# Patient Record
Sex: MALE | Race: BLACK OR AFRICAN AMERICAN | NOT HISPANIC OR LATINO | URBAN - METROPOLITAN AREA
[De-identification: names, ages, dates, MRNs, and addresses within clinical notes are randomized per-mention and may not be internally consistent; named-entity substitution may affect disease eponyms.]

---

## 2023-08-14 ENCOUNTER — INPATIENT (INPATIENT)
Facility: HOSPITAL | Age: 63
LOS: 4 days | Discharge: HOME HEALTH | DRG: 291 | End: 2023-08-19
Attending: STUDENT IN AN ORGANIZED HEALTH CARE EDUCATION/TRAINING PROGRAM | Admitting: STUDENT IN AN ORGANIZED HEALTH CARE EDUCATION/TRAINING PROGRAM
Payer: MEDICARE

## 2023-08-14 VITALS
TEMPERATURE: 100 F | RESPIRATION RATE: 18 BRPM | HEART RATE: 106 BPM | OXYGEN SATURATION: 77 % | SYSTOLIC BLOOD PRESSURE: 151 MMHG | DIASTOLIC BLOOD PRESSURE: 90 MMHG | WEIGHT: 164.91 LBS

## 2023-08-14 DIAGNOSIS — J18.9 PNEUMONIA, UNSPECIFIED ORGANISM: ICD-10-CM

## 2023-08-14 DIAGNOSIS — N40.0 BENIGN PROSTATIC HYPERPLASIA WITHOUT LOWER URINARY TRACT SYMPTOMS: ICD-10-CM

## 2023-08-14 DIAGNOSIS — B20 HUMAN IMMUNODEFICIENCY VIRUS [HIV] DISEASE: ICD-10-CM

## 2023-08-14 DIAGNOSIS — I50.33 ACUTE ON CHRONIC DIASTOLIC (CONGESTIVE) HEART FAILURE: ICD-10-CM

## 2023-08-14 DIAGNOSIS — I26.99 OTHER PULMONARY EMBOLISM WITHOUT ACUTE COR PULMONALE: ICD-10-CM

## 2023-08-14 DIAGNOSIS — Z87.09 PERSONAL HISTORY OF OTHER DISEASES OF THE RESPIRATORY SYSTEM: ICD-10-CM

## 2023-08-14 DIAGNOSIS — E78.5 HYPERLIPIDEMIA, UNSPECIFIED: ICD-10-CM

## 2023-08-14 LAB
ALBUMIN SERPL ELPH-MCNC: 3.2 G/DL — LOW (ref 3.4–5)
ALP SERPL-CCNC: 62 U/L — SIGNIFICANT CHANGE UP (ref 40–120)
ALT FLD-CCNC: 22 U/L — SIGNIFICANT CHANGE UP (ref 12–42)
ANION GAP SERPL CALC-SCNC: 9 MMOL/L — SIGNIFICANT CHANGE UP (ref 9–16)
AST SERPL-CCNC: 32 U/L — SIGNIFICANT CHANGE UP (ref 15–37)
BASOPHILS # BLD AUTO: 0.02 K/UL — SIGNIFICANT CHANGE UP (ref 0–0.2)
BASOPHILS NFR BLD AUTO: 0.2 % — SIGNIFICANT CHANGE UP (ref 0–2)
BILIRUB SERPL-MCNC: 1 MG/DL — SIGNIFICANT CHANGE UP (ref 0.2–1.2)
BUN SERPL-MCNC: 11 MG/DL — SIGNIFICANT CHANGE UP (ref 7–23)
CALCIUM SERPL-MCNC: 8.7 MG/DL — SIGNIFICANT CHANGE UP (ref 8.5–10.5)
CHLORIDE SERPL-SCNC: 104 MMOL/L — SIGNIFICANT CHANGE UP (ref 96–108)
CO2 SERPL-SCNC: 29 MMOL/L — SIGNIFICANT CHANGE UP (ref 22–31)
CREAT SERPL-MCNC: 1.08 MG/DL — SIGNIFICANT CHANGE UP (ref 0.5–1.3)
EGFR: 76 ML/MIN/1.73M2 — SIGNIFICANT CHANGE UP
EOSINOPHIL # BLD AUTO: 0.05 K/UL — SIGNIFICANT CHANGE UP (ref 0–0.5)
EOSINOPHIL NFR BLD AUTO: 0.5 % — SIGNIFICANT CHANGE UP (ref 0–6)
FLUAV AG NPH QL: SIGNIFICANT CHANGE UP
FLUBV AG NPH QL: SIGNIFICANT CHANGE UP
GLUCOSE SERPL-MCNC: 90 MG/DL — SIGNIFICANT CHANGE UP (ref 70–99)
HCT VFR BLD CALC: 39.5 % — SIGNIFICANT CHANGE UP (ref 39–50)
HGB BLD-MCNC: 13 G/DL — SIGNIFICANT CHANGE UP (ref 13–17)
HIV 1 & 2 AB SERPL IA.RAPID: REACTIVE
IMM GRANULOCYTES NFR BLD AUTO: 0.3 % — SIGNIFICANT CHANGE UP (ref 0–0.9)
LYMPHOCYTES # BLD AUTO: 1.66 K/UL — SIGNIFICANT CHANGE UP (ref 1–3.3)
LYMPHOCYTES # BLD AUTO: 15.1 % — SIGNIFICANT CHANGE UP (ref 13–44)
MCHC RBC-ENTMCNC: 32.4 PG — SIGNIFICANT CHANGE UP (ref 27–34)
MCHC RBC-ENTMCNC: 32.9 GM/DL — SIGNIFICANT CHANGE UP (ref 32–36)
MCV RBC AUTO: 98.5 FL — SIGNIFICANT CHANGE UP (ref 80–100)
MONOCYTES # BLD AUTO: 0.77 K/UL — SIGNIFICANT CHANGE UP (ref 0–0.9)
MONOCYTES NFR BLD AUTO: 7 % — SIGNIFICANT CHANGE UP (ref 2–14)
NEUTROPHILS # BLD AUTO: 8.47 K/UL — HIGH (ref 1.8–7.4)
NEUTROPHILS NFR BLD AUTO: 76.9 % — SIGNIFICANT CHANGE UP (ref 43–77)
NRBC # BLD: 0 /100 WBCS — SIGNIFICANT CHANGE UP (ref 0–0)
NT-PROBNP SERPL-SCNC: 4032 PG/ML — HIGH
PCO2 BLDV: 47 MMHG — SIGNIFICANT CHANGE UP (ref 42–55)
PH BLDV: 7.41 — SIGNIFICANT CHANGE UP (ref 7.32–7.43)
PLATELET # BLD AUTO: 254 K/UL — SIGNIFICANT CHANGE UP (ref 150–400)
PO2 BLDV: 67 MMHG — HIGH (ref 25–45)
POTASSIUM SERPL-MCNC: 3.6 MMOL/L — SIGNIFICANT CHANGE UP (ref 3.5–5.3)
POTASSIUM SERPL-SCNC: 3.6 MMOL/L — SIGNIFICANT CHANGE UP (ref 3.5–5.3)
PROT SERPL-MCNC: 7.9 G/DL — SIGNIFICANT CHANGE UP (ref 6.4–8.2)
RBC # BLD: 4.01 M/UL — LOW (ref 4.2–5.8)
RBC # FLD: 14.8 % — HIGH (ref 10.3–14.5)
RSV RNA NPH QL NAA+NON-PROBE: SIGNIFICANT CHANGE UP
SAO2 % BLDV: 94.1 % — HIGH (ref 67–88)
SARS-COV-2 RNA SPEC QL NAA+PROBE: SIGNIFICANT CHANGE UP
SODIUM SERPL-SCNC: 142 MMOL/L — SIGNIFICANT CHANGE UP (ref 132–145)
TROPONIN I, HIGH SENSITIVITY RESULT: 35.9 NG/L — SIGNIFICANT CHANGE UP
WBC # BLD: 11 K/UL — HIGH (ref 3.8–10.5)
WBC # FLD AUTO: 11 K/UL — HIGH (ref 3.8–10.5)

## 2023-08-14 PROCEDURE — 71045 X-RAY EXAM CHEST 1 VIEW: CPT | Mod: 26

## 2023-08-14 PROCEDURE — 99222 1ST HOSP IP/OBS MODERATE 55: CPT

## 2023-08-14 PROCEDURE — 99285 EMERGENCY DEPT VISIT HI MDM: CPT

## 2023-08-14 RX ORDER — CEFTRIAXONE 500 MG/1
1000 INJECTION, POWDER, FOR SOLUTION INTRAMUSCULAR; INTRAVENOUS ONCE
Refills: 0 | Status: COMPLETED | OUTPATIENT
Start: 2023-08-14 | End: 2023-08-14

## 2023-08-14 RX ORDER — BUDESONIDE AND FORMOTEROL FUMARATE DIHYDRATE 160; 4.5 UG/1; UG/1
2 AEROSOL RESPIRATORY (INHALATION)
Refills: 0 | DISCHARGE

## 2023-08-14 RX ORDER — ATORVASTATIN CALCIUM 80 MG/1
1 TABLET, FILM COATED ORAL
Refills: 0 | DISCHARGE

## 2023-08-14 RX ORDER — RIVAROXABAN 15 MG-20MG
1 KIT ORAL
Refills: 0 | DISCHARGE

## 2023-08-14 RX ORDER — FUROSEMIDE 40 MG
40 TABLET ORAL ONCE
Refills: 0 | Status: COMPLETED | OUTPATIENT
Start: 2023-08-14 | End: 2023-08-14

## 2023-08-14 RX ORDER — ALBUTEROL 90 UG/1
2 AEROSOL, METERED ORAL
Refills: 0 | DISCHARGE

## 2023-08-14 RX ORDER — AZITHROMYCIN 500 MG/1
500 TABLET, FILM COATED ORAL ONCE
Refills: 0 | Status: COMPLETED | OUTPATIENT
Start: 2023-08-14 | End: 2023-08-14

## 2023-08-14 RX ADMIN — CEFTRIAXONE 100 MILLIGRAM(S): 500 INJECTION, POWDER, FOR SOLUTION INTRAMUSCULAR; INTRAVENOUS at 15:40

## 2023-08-14 RX ADMIN — Medication 40 MILLIGRAM(S): at 14:36

## 2023-08-14 RX ADMIN — AZITHROMYCIN 255 MILLIGRAM(S): 500 TABLET, FILM COATED ORAL at 16:16

## 2023-08-14 RX ADMIN — AZITHROMYCIN 500 MILLIGRAM(S): 500 TABLET, FILM COATED ORAL at 17:23

## 2023-08-14 NOTE — ED PROVIDER NOTE - PHYSICAL EXAMINATION
hypoxic in no significant distress, speaking in full sentences, RA SpO2  NCAT EOMI PERRL OP clear  heart RRR no murmur   lungs crackles B/L, dec BS B/L, no wheezing, good air entry B/L  abd soft NT ND no CVAT no guarding no rebound   normal neuro exam CN I-XII grossly intact, no groos motor or sensory deficits  B/L LE edema (+) 2 B/L, appears chronic

## 2023-08-14 NOTE — H&P ADULT - PROBLEM SELECTOR PLAN 7
continue HOME MED: Tamsulosin 0.4mg PO daily      N: DASH with 1 liter fluid restriction  E: Maintain K>4.0 and Mg >2.0  VTE PPx: on Xarelto  Code: Full former heroine addict, quit in 1998.  --currently on Methadone 95mg PO daily per patient, will need to verify dose with **[facility] [phone].

## 2023-08-14 NOTE — H&P ADULT - PROBLEM SELECTOR PLAN 2
hx of LLE DVT and PE diagnosed in 8/2020.  -- will continue anticoagulation with HOME MED: Xarelto 20mg PO daily. hx of LLE DVT and PE diagnosed in 8/2020. Recommended to be on lifelong anticoagulation per patient, unclear if he has had hypercoagulable work-up.  -- will continue anticoagulation with HOME MED: Xarelto 20mg PO daily.

## 2023-08-14 NOTE — H&P ADULT - HISTORY OF PRESENT ILLNESS
**INCOMPLETE    63 yr old PMHx of HIV (on HAART since 2008, VL undetectable), COPD (not on home O2), hx of LLE DVT, PE (diagnosed 8/2020, on Xarelto), hyperlipidemia, chronic HFpEF (EF:65%), nonobstructive coronaries (by CCTA 6/30/23), BPH who presented to [facility] 8/14/23 c/o progressively worsening dyspnea on minimal exertion x few days.        Patient states he has had difficulty even climbing out of bed the last few days, upon arrival of [facility] this morning patient was acutely SOB. Patient denies any other symptoms of N/V, diaphoresis, palpitations, chest pain, PND, orthopnea, recent travel or URI's. Patient reports chronic bilateral LE edema. Patient also admit to self discontinuing his Lasix.       In [facility], BP: 151/90, HR: 100s, RR:18, O2 sat: 80s% RA improved to 99% on 2-3LNC, Temp: 99.5F (oral). EKG revealed Sinus tachycardia@101BPM without acute ST/T wave changes. CXR c/w pulmonary vascular congestion, interstitial edema.    Labs notable for: WBC 11 with slight left shift, K 3.6, Trop I 35.9, BNP 4032, COVID/Influenza/RSV negative. Blood cultures obtained.    Patient treated with Ceftriaxone 1g IV x 1 dose, Azithromycin 500mg IV x 1 dose and Lasix 40mg IV x 1 dose.    Patient now transferred to [facility] cardiac telemetry for management of acute on chronic HFpEF exacerbation in setting of suspected CAP and medication noncompliance. **INCOMPLETE    63 yr old PMHx of HIV (on HAART since 2008, VL undetectable), COPD (not on home O2), hx of LLE DVT, PE (diagnosed 8/2020, on Xarelto), hyperlipidemia, chronic HFpEF (EF:65%), nonobstructive coronaries (by CCTA 6/30/23), BPH who presented to [facility] 8/14/23 c/o progressively worsening dyspnea on minimal exertion x few days.        Patient states he has had difficulty even climbing out of bed the last few days, upon arrival of [facility] this morning patient was acutely SOB. Patient denies any other symptoms of N/V, diaphoresis, palpitations, chest pain, PND, orthopnea, recent travel or URI's. Patient reports chronic bilateral LE edema. Patient also admit to self discontinuing his Lasix.       In [facility], BP: 151/90, HR: 100s, RR:18, O2 sat: 80s% RA improved to 99% on 2-3LNC, Temp: 99.5F (oral). EKG revealed Sinus tachycardia@101BPM without acute ST/T wave changes. CXR c/w pulmonary vascular congestion, interstitial edema.    Labs notable for: WBC 11 with slight left shift, K 3.6, Trop I 35.9, BNP 4032, COVID/Influenza/RSV negative. Blood cultures obtained.    Patient treated with Ceftriaxone 1g IV x 1 dose, Azithromycin 500mg IV x 1 dose and Lasix 40mg IV x 1 dose.    Patient now transferred to [facility] cardiac telemetry for management of acute on chronic HFpEF exacerbation in setting of medication noncompliance and suspected CAP. 63 yr old M, chronic smoker, former heroine addict (on Methadone), PMHx of HIV (on HAART since 2008, VL undetectable), COPD (not on home O2), hx of LLE DVT (diagnosed 2018), PE (diagnosed 8/2020, on Xarelto), hyperlipidemia, chronic HFpEF (EF:65%), nonobstructive coronaries (by CCTA 6/30/23), BPH who presented to [facility] 8/14/23 c/o progressively worsening dyspnea on minimal exertion x few days. Patient reports at baseline he is able to ambulate ~1/2 block with his walker prior to resting, however over the last few days he has difficulty even climbing out of bed. Patient further admits to recent fever, chills and productive cough over the past 3 days. Patient admits to orthopnea as he sleeps in recliner and chronic bilateral LE edema. Patient denies any other symptoms of N/V, diaphoresis, palpitations, chest pain or recent travel. Patient reports he self discontinued his Lasix several months ago.     In [facility], BP: 151/90, HR: 100s, RR:18, O2 sat: 80s% RA improved to 99% on 2-3LNC, Temp: 99.5F (oral). EKG revealed Sinus tachycardia@101BPM without acute ST/T wave changes. CXR c/w pulmonary vascular congestion, interstitial edema. Labs notable for: WBC 11 with slight left shift, K 3.6, Trop I 35.9, BNP 4032, COVID/Influenza/RSV negative. Blood cultures obtained. Patient treated with Ceftriaxone 1g IV x 1 dose, Azithromycin 500mg IV x 1 dose and Lasix 40mg IV x 1 dose.    Patient now transferred to [facility] cardiac telemetry for management of acute on chronic HFpEF exacerbation in setting of medication noncompliance and suspected CAP. Chief complaint: dyspnea    63 yr old M, chronic smoker, former heroine addict (on Methadone), PMHx of HIV (on HAART since 2008, VL undetectable), COPD (not on home O2), hx of LLE DVT (diagnosed 2018), PE (diagnosed 8/2020, on Xarelto), hyperlipidemia, chronic HFpEF (EF:65%), nonobstructive coronaries (by CCTA 6/30/23), BPH who presented to [facility] 8/14/23 c/o progressively worsening dyspnea on minimal exertion x few days. Patient reports at baseline he is able to ambulate ~1/2 block with his walker prior to resting, however over the last few days he has difficulty even climbing out of bed. Patient further admits to recent fever, chills and productive cough over the past 3 days. Patient admits to orthopnea as he sleeps in recliner and chronic bilateral LE edema. Patient denies any other symptoms of N/V, diaphoresis, palpitations, chest pain or recent travel. Patient reports he self discontinued his Lasix several months ago.     In [facility], BP: 151/90, HR: 100s, RR:18, O2 sat: 80s% RA improved to 99% on 2-3LNC, Temp: 99.5F (oral). EKG revealed Sinus tachycardia@101BPM without acute ST/T wave changes. CXR c/w pulmonary vascular congestion, interstitial edema. Labs notable for: WBC 11 with slight left shift, K 3.6, Trop I 35.9, BNP 4032, COVID/Influenza/RSV negative. Blood cultures obtained. Patient treated with Ceftriaxone 1g IV x 1 dose, Azithromycin 500mg IV x 1 dose and Lasix 40mg IV x 1 dose.    Patient now transferred to [facility] cardiac telemetry for management of acute on chronic HFpEF exacerbation in setting of medication noncompliance and suspected CAP.

## 2023-08-14 NOTE — H&P ADULT - PROBLEM SELECTOR PLAN 8
continue HOME MED: Tamsulosin 0.8mg PO daily      N: DASH with 1 liter fluid restriction  E: Maintain K>4.0 and Mg >2.0  VTE PPx: on Xarelto  Code: Full

## 2023-08-14 NOTE — H&P ADULT - NSHPOUTPATIENTPROVIDERS_GEN_ALL_CORE
Cannot recall name of Cardiologist/Pulmonologist/PCP, states all affiliated / [facility]  **Emergency contact/- Stvee Fuentes [phone], **[facility] [phone]

## 2023-08-14 NOTE — H&P ADULT - NSICDXPASTMEDICALHX_GEN_ALL_CORE_FT
PAST MEDICAL HISTORY:  COPD exacerbation     HIV infection     HLD (hyperlipidemia)     HTN (hypertension)      PAST MEDICAL HISTORY:  Chronic heart failure with preserved ejection fraction (HFpEF)     COPD exacerbation     DVT, lower extremity     HIV infection     HLD (hyperlipidemia)     HTN (hypertension)     Pulmonary embolism

## 2023-08-14 NOTE — H&P ADULT - PROBLEM SELECTOR PLAN 5
continue HOME MEDS: Ventolin HFA 90mcg/inh PRN, Symbicord 160/4.5mcg 2 puffs inh BID and Spiriva inhaled daily. continue HOME MEDS: Ventolin HFA 90mcg/inh PRN, Symbicort 160/4.5mcg 2 puffs inh BID and Spiriva inhaled daily.

## 2023-08-14 NOTE — H&P ADULT - ASSESSMENT
63 yr old PMHx of HIV (on HAART since 2008, VL undetectable), COPD (not on home O2), hx of LLE DVT, PE (diagnosed 8/2020, on Xarelto), hyperlipidemia, chronic HFpEF (EF:65%), nonobstructive coronaries (by CCTA 6/30/23), BPH who presented to [facility] 8/14/23 c/o progressively worsening dyspnea on minimal exertion x few days,  transferred to [facility] cardiac telemetry for management of acute on chronic HFpEF exacerbation in setting of medication noncompliance and suspected CAP. 63 yr old M chronic smoker, former heroine addict,  PMHx of HIV (on HAART since 2008, VL undetectable), COPD (not on home O2), hx of LLE DVT, PE (diagnosed 8/2020, on Xarelto), hyperlipidemia, chronic HFpEF (EF:65%), nonobstructive coronaries (by CCTA 6/30/23), BPH who presented to [facility] 8/14/23 c/o progressively worsening dyspnea on minimal exertion x few days,  transferred to [facility] cardiac telemetry for management of acute on chronic HFpEF exacerbation in setting of medication noncompliance and suspected CAP.

## 2023-08-14 NOTE — ED PROVIDER NOTE - NSICDXPASTMEDICALHX_GEN_ALL_CORE_FT
PAST MEDICAL HISTORY:  COPD exacerbation     HIV infection     HLD (hyperlipidemia)     HTN (hypertension)

## 2023-08-14 NOTE — H&P ADULT - NSHPREVIEWOFSYSTEMS_GEN_ALL_CORE
Furosemide 20 mg oral tablet: Last Dose Taken:  , 1 orally once a day  Ventolin HFA 90 mcg/inh inhalation aerosol: Last Dose Taken:  , 2 inhaled every 6 hours  Atorvastatin 10 mg oral tablet: Last Dose Taken:  , 1 orally once a day  Tamsulosin 0.4 mg oral capsule: Last Dose Taken:  , 2 orally once a day  Symbicort 160 mcg-4.5 mcg/inh inhalation aerosol: Last Dose Taken:  , 2 inhaled 2 times a day  Spiriva 18 mcg inhalation capsule: Last Dose Taken:  , 1 inhaled  Descovy 200 mg-25 mg oral tablet: Last Dose Taken:  , 1 orally  Xarelto 20 mg oral tablet: Last Dose Taken:  , 1 orally once a day  Tivicay?  Losartan 50mg PO daily?

## 2023-08-14 NOTE — ED ADULT TRIAGE NOTE - AS TEMP SITE
Clinic Care Coordination Contact  Care Team Conversations    RN CC received MyChart message from patient's mom with escalation in concerns regarding his weight. Requested to forward to Dr. Leyva; RN CC forwarded to Dr. Leyva, and upon chart review he has responded with support for mom's nutrition intake plan and offered suggestions regarding thyroid medication regimen.     Mom has not requested additional information (as offered) for respite or additional caregiver resources at this time.     PLAN:  RN CC will continue to offer support, assistance in coordination of recommended follow up. Will place next outreach encounter to coincide with scheduled 3/29/19 office visit with PCP.     Asia Barriga RN   Clinic Care Coordinator-Singh Solomon  PH: [phone]     oral

## 2023-08-14 NOTE — ED ADULT NURSE REASSESSMENT NOTE - NS ED NURSE REASSESS COMMENT FT1
pt endorsed to me, in NAD, resting in stretcher, reports feeling better after water pill was given. pt breathing well on 3L NC. zitrhomax infusion administered as ordered. will continue to monitor.

## 2023-08-14 NOTE — H&P ADULT - PROBLEM SELECTOR PLAN 4
oral temp 99.5F upon arrival to [facility], slight leukocytosis w/ left shift. CXR without definitive consolidation. COVID/Flu/RSV negative. Blood cultures obtained.  --received Azithromycin 500mg IV x 1 dose and Ceftriaxone 1g IV x 1 dose at [facility].  --will F/U Procalcitonin level and consider continuation of antibiotics. oral temp 99.5F upon arrival to [facility], slight leukocytosis w/ left shift. CXR without definitive consolidation. COVID/Flu/RSV negative. Blood cultures obtained.  --received Azithromycin 500mg IV x 1 dose and Ceftriaxone 1g IV x 1 dose at [facility].  --will F/U full RVP panel, Procalcitonin level and consider continuation of antibiotics.

## 2023-08-14 NOTE — ED ADULT NURSE NOTE - HISTORY OF COVID-19 VACCINATION
[facility]      Procedure Note - Central Venous Access:   Performed by Micki Mead NP    Obtained informed Consent. Immediately prior to the procedure, the patient was reevaluated and found suitable for the planned procedure and any planned medications. Immediately prior to the procedure a time out was called to verify the correct patient, procedure, equipment, staff, and marking as appropriate. Shock    Central line Bundle:  Full sterile barrier precautions used. 7-Step Sterility Protocol followed. (cap, mask sterile gown, sterile gloves, large sterile sheet, hand hygiene, 2% chlorhexidine for cutaneous antisepsis)  5 mL 1% Lidocaine placed at insertion site. Patient positioned in Trendelenburg?yes   The site was prepped with ChloraPrep. Using Seldinger technique a Triple Lumen CVC was placed in the Left, Internal Jugular Vein via direct cannulation with 1 number of attempts for IV Access. Ultrasound Guidance was utilized. There was good dark, non-pulsatile blood return in all ports. Femoral Site? no. If Yes, reason femoral site was chosen: na  Catheter secured. Biopatch in place? yes. Sterile Bio-occlusive dressing placed. The following complications were encountered: None. A follow-up chest x-ray was ordered post procedure. The procedure was tolerated well.       Micki Mead NP  Critical Care Medicine  [facility] Vaccine status unknown

## 2023-08-14 NOTE — H&P ADULT - NS ATTEND AMEND GEN_ALL_CORE FT
I saw, evaluated, and examined the patient at the bedside. I discussed the patient’s case with the ACP and agree with ACP’s note, ROS findings, physical exam, assessment, and plan as documented in the ACP’s note, with the following addendum.    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    Assessment:  1) Acute on chronic decompensated HFpEF exacerbation  2) Unprovoked DVT and PE on lifelong AC w/ xarelto  3) HIV on HAART    Plan  1) IV lasix 40mg BID for one more day w/ strict I/O and daily standing weight  2) TTE  3) Losartan 50mg daily  4) C/w AC with xarelto at home dose  5) HAART at home dose    Total of 55 minutes were spent in reviewing the patient’s chart, examining the patient, and discussing patient’s assessment and plan.     Maxx Slade M.D.  CARDIOLOGY ATTENDING

## 2023-08-14 NOTE — H&P ADULT - PROBLEM SELECTOR PLAN 1
+JVD, bibasilar crackles, 2+ pitting edema bilaterally, sating 99% on 2LNC.  --CXR w/ pulmonary vascular congestion, interstitial edema. BNP 4032.  --received Lasix 40mg IV x 1 dose at [facility], home dose Lasix 20mg PO daily which patient has self discontinued.  --will continue Lasix 40mg IV q 12hrs, Losartan 50mg PO daily, strict I/Os and daily weights.  --obtain TTE in AM.      **Prior TTE 3/24/23: LVEF 60-65%, small focal area of hypokinesis in apical inferolateral wall. Dilated RV, septal flattening, PFO by color. MV leaflets are echodense and bileaflet prolapse.    **Recent CCTA@[facility] 6/30/23: calcium score 0, mild annular calcification, mLAD intramyocardial bridge, grossly patent m/dLAD, pLCx, OM1, pRCA.

## 2023-08-14 NOTE — H&P ADULT - PROBLEM SELECTOR PLAN 6
undetectable VL per patient, will continue HOME MEDS: Descovy 200/25mg PO daily and Tivicay   mg PO daily. undetectable VL per patient, will continue HOME MEDS: Descovy 200/25mg PO daily and Tivicay  50 mg PO daily.

## 2023-08-14 NOTE — ED PROVIDER NOTE - PROGRESS NOTE DETAILS
Initially on arrival, patient gave history of COPD and CHF.  HIV positive, I have reported this to patient and he states he is known HIV, viral load undetectable and on Descovy and Tivicay. Improved after Lasix IV, still with oxygen requirement, patient Known to [facility].  I called there to get a medication list at the pharmacy.  I attempted to admit/transfer to [facility], but the transfer center stated that all of his visits are outpatient visits to [facility].  Heart failure patients are no longer being accepted for admission to [facility].  [facility] is in a "bed crunch "and is unable to accept the patient at this time.  Will admit to [facility] for further treatment and work-up of CHF exacerbation.  Patient was also given IV antibiotics for possible underlying pneumonia. Spoke with Dr. Huggins from medicine service, requested admission to cardiology service.  I spoke with cardiology service briefly but conversation was interrupted–transfer center stated they would reconnect with cardiology.  Signed out to Dr. Busby pending cardiology evaluation and final dispo.

## 2023-08-14 NOTE — ED PROVIDER NOTE - OBJECTIVE STATEMENT
63-year-old male who with history of pulmonary embolism on Xarelto, CHF on Lasix 20 mg, COPD on inhalers at home, not oxygen dependent.  Today with complaints of progressive shortness of breath difficulty with ADLs, patient reports that any activity makes him short of breath, he is usually able to get out of bed but for the last several days he has had a decreased ability to do so, decreased exercise or walking tolerance.  He is needing increased help with his ADLs at home over the last few days.  He has a home health aide who is with him who states that he looked to be very short of breath on her arrival this morning.  She called 911 and had him brought to the ED.  On arrival he states he has had no recent upper respiratory infection, no chest pain.  No nausea/vomiting or recent illness, no recent travel.  Reports non compliance with medications - has dced his lasix. 63-year-old male who with history of pulmonary embolism on Xarelto, CHF on Lasix 20 mg, HIV, COPD on inhalers at home, not oxygen dependent.  Today with complaints of progressive shortness of breath difficulty with ADLs, patient reports that any activity makes him short of breath, he is usually able to get out of bed but for the last several days he has had a decreased ability to do so, decreased exercise or walking tolerance.  He is needing increased help with his ADLs at home over the last few days.  He has a home health aide who is with him who states that he looked to be very short of breath on her arrival this morning.  She called 911 and had him brought to the ED.  On arrival he states he has had no recent upper respiratory infection, no chest pain.  No nausea/vomiting or recent illness, no recent travel.  Reports non compliance with medications - has dced his lasix.

## 2023-08-14 NOTE — ED ADULT NURSE NOTE - OBJECTIVE STATEMENT
pt bib ems for SOB at home, hx of COPD but denies use of o2 at home. RA 77% sat, 4L NC sating 95. resp unlabored, lungs CTA. denies chest pain, denies fever/chills, denies N/V/D.

## 2023-08-15 DIAGNOSIS — R63.8 OTHER SYMPTOMS AND SIGNS CONCERNING FOOD AND FLUID INTAKE: ICD-10-CM

## 2023-08-15 DIAGNOSIS — F19.11 OTHER PSYCHOACTIVE SUBSTANCE ABUSE, IN REMISSION: ICD-10-CM

## 2023-08-15 DIAGNOSIS — I26.99 OTHER PULMONARY EMBOLISM WITHOUT ACUTE COR PULMONALE: ICD-10-CM

## 2023-08-15 LAB
A1C WITH ESTIMATED AVERAGE GLUCOSE RESULT: 5.5 % — SIGNIFICANT CHANGE UP (ref 4–5.6)
ANION GAP SERPL CALC-SCNC: 8 MMOL/L — SIGNIFICANT CHANGE UP (ref 5–17)
APTT BLD: 38.3 SEC — HIGH (ref 24.5–35.6)
BUN SERPL-MCNC: 8 MG/DL — SIGNIFICANT CHANGE UP (ref 7–23)
CALCIUM SERPL-MCNC: 9.1 MG/DL — SIGNIFICANT CHANGE UP (ref 8.4–10.5)
CHLORIDE SERPL-SCNC: 98 MMOL/L — SIGNIFICANT CHANGE UP (ref 96–108)
CHOLEST SERPL-MCNC: 130 MG/DL — SIGNIFICANT CHANGE UP
CK MB CFR SERPL CALC: 3.2 NG/ML — SIGNIFICANT CHANGE UP (ref 0–6.7)
CK SERPL-CCNC: 133 U/L — SIGNIFICANT CHANGE UP (ref 30–200)
CO2 SERPL-SCNC: 33 MMOL/L — HIGH (ref 22–31)
CREAT SERPL-MCNC: 0.99 MG/DL — SIGNIFICANT CHANGE UP (ref 0.5–1.3)
EGFR: 85 ML/MIN/1.73M2 — SIGNIFICANT CHANGE UP
ESTIMATED AVERAGE GLUCOSE: 111 MG/DL — SIGNIFICANT CHANGE UP (ref 68–114)
GLUCOSE SERPL-MCNC: 73 MG/DL — SIGNIFICANT CHANGE UP (ref 70–99)
HCT VFR BLD CALC: 40.5 % — SIGNIFICANT CHANGE UP (ref 39–50)
HCV AB S/CO SERPL IA: 169.6 S/CO — HIGH
HCV AB SERPL-IMP: REACTIVE
HDLC SERPL-MCNC: 36 MG/DL — LOW
HGB BLD-MCNC: 13.5 G/DL — SIGNIFICANT CHANGE UP (ref 13–17)
INR BLD: 1.51 — HIGH (ref 0.85–1.18)
LIPID PNL WITH DIRECT LDL SERPL: 83 MG/DL — SIGNIFICANT CHANGE UP
MAGNESIUM SERPL-MCNC: 1.6 MG/DL — SIGNIFICANT CHANGE UP (ref 1.6–2.6)
MCHC RBC-ENTMCNC: 32.4 PG — SIGNIFICANT CHANGE UP (ref 27–34)
MCHC RBC-ENTMCNC: 33.3 GM/DL — SIGNIFICANT CHANGE UP (ref 32–36)
MCV RBC AUTO: 97.1 FL — SIGNIFICANT CHANGE UP (ref 80–100)
NON HDL CHOLESTEROL: 94 MG/DL — SIGNIFICANT CHANGE UP
NRBC # BLD: 0 /100 WBCS — SIGNIFICANT CHANGE UP (ref 0–0)
PHOSPHATE SERPL-MCNC: 3.4 MG/DL — SIGNIFICANT CHANGE UP (ref 2.5–4.5)
PLATELET # BLD AUTO: 239 K/UL — SIGNIFICANT CHANGE UP (ref 150–400)
POTASSIUM SERPL-MCNC: 3.4 MMOL/L — LOW (ref 3.5–5.3)
POTASSIUM SERPL-SCNC: 3.4 MMOL/L — LOW (ref 3.5–5.3)
PROCALCITONIN SERPL-MCNC: 0.08 NG/ML — SIGNIFICANT CHANGE UP (ref 0.02–0.1)
PROTHROM AB SERPL-ACNC: 17 SEC — HIGH (ref 9.5–13)
RAPID RVP RESULT: SIGNIFICANT CHANGE UP
RBC # BLD: 4.17 M/UL — LOW (ref 4.2–5.8)
RBC # FLD: 14.5 % — SIGNIFICANT CHANGE UP (ref 10.3–14.5)
SARS-COV-2 RNA SPEC QL NAA+PROBE: SIGNIFICANT CHANGE UP
SODIUM SERPL-SCNC: 139 MMOL/L — SIGNIFICANT CHANGE UP (ref 135–145)
TRIGL SERPL-MCNC: 57 MG/DL — SIGNIFICANT CHANGE UP
TROPONIN T, HIGH SENSITIVITY RESULT: 23 NG/L — SIGNIFICANT CHANGE UP (ref 0–51)
TROPONIN T, HIGH SENSITIVITY RESULT: 28 NG/L — SIGNIFICANT CHANGE UP (ref 0–51)
WBC # BLD: 6.99 K/UL — SIGNIFICANT CHANGE UP (ref 3.8–10.5)
WBC # FLD AUTO: 6.99 K/UL — SIGNIFICANT CHANGE UP (ref 3.8–10.5)

## 2023-08-15 PROCEDURE — 93306 TTE W/DOPPLER COMPLETE: CPT | Mod: 26

## 2023-08-15 RX ORDER — TIOTROPIUM BROMIDE 18 UG/1
1 CAPSULE ORAL; RESPIRATORY (INHALATION)
Refills: 0 | DISCHARGE

## 2023-08-15 RX ORDER — EMTRICITABINE AND TENOFOVIR DISOPROXIL FUMARATE 200; 300 MG/1; MG/1
1 TABLET, FILM COATED ORAL DAILY
Refills: 0 | Status: DISCONTINUED | OUTPATIENT
Start: 2023-08-15 | End: 2023-08-19

## 2023-08-15 RX ORDER — TAMSULOSIN HYDROCHLORIDE 0.4 MG/1
2 CAPSULE ORAL
Refills: 0 | DISCHARGE

## 2023-08-15 RX ORDER — LOSARTAN POTASSIUM 100 MG/1
1 TABLET, FILM COATED ORAL
Refills: 0 | DISCHARGE

## 2023-08-15 RX ORDER — LOSARTAN POTASSIUM 100 MG/1
50 TABLET, FILM COATED ORAL DAILY
Refills: 0 | Status: DISCONTINUED | OUTPATIENT
Start: 2023-08-15 | End: 2023-08-16

## 2023-08-15 RX ORDER — RIVAROXABAN 15 MG-20MG
20 KIT ORAL
Refills: 0 | Status: DISCONTINUED | OUTPATIENT
Start: 2023-08-15 | End: 2023-08-19

## 2023-08-15 RX ORDER — TAMSULOSIN HYDROCHLORIDE 0.4 MG/1
0.8 CAPSULE ORAL AT BEDTIME
Refills: 0 | Status: DISCONTINUED | OUTPATIENT
Start: 2023-08-15 | End: 2023-08-19

## 2023-08-15 RX ORDER — DOLUTEGRAVIR SODIUM 25 MG/1
50 TABLET, FILM COATED ORAL DAILY
Refills: 0 | Status: DISCONTINUED | OUTPATIENT
Start: 2023-08-15 | End: 2023-08-19

## 2023-08-15 RX ORDER — MAGNESIUM SULFATE 500 MG/ML
2 VIAL (ML) INJECTION ONCE
Refills: 0 | Status: DISCONTINUED | OUTPATIENT
Start: 2023-08-15 | End: 2023-08-15

## 2023-08-15 RX ORDER — ALBUTEROL 90 UG/1
2 AEROSOL, METERED ORAL EVERY 6 HOURS
Refills: 0 | Status: DISCONTINUED | OUTPATIENT
Start: 2023-08-15 | End: 2023-08-19

## 2023-08-15 RX ORDER — DOLUTEGRAVIR SODIUM 25 MG/1
1 TABLET, FILM COATED ORAL
Refills: 0 | DISCHARGE

## 2023-08-15 RX ORDER — LABETALOL HCL 100 MG
10 TABLET ORAL ONCE
Refills: 0 | Status: DISCONTINUED | OUTPATIENT
Start: 2023-08-15 | End: 2023-08-15

## 2023-08-15 RX ORDER — POTASSIUM CHLORIDE 20 MEQ
40 PACKET (EA) ORAL EVERY 4 HOURS
Refills: 0 | Status: COMPLETED | OUTPATIENT
Start: 2023-08-15 | End: 2023-08-15

## 2023-08-15 RX ORDER — MAGNESIUM SULFATE 500 MG/ML
2 VIAL (ML) INJECTION ONCE
Refills: 0 | Status: COMPLETED | OUTPATIENT
Start: 2023-08-15 | End: 2023-08-15

## 2023-08-15 RX ORDER — FUROSEMIDE 40 MG
40 TABLET ORAL EVERY 12 HOURS
Refills: 0 | Status: DISCONTINUED | OUTPATIENT
Start: 2023-08-15 | End: 2023-08-18

## 2023-08-15 RX ORDER — ATORVASTATIN CALCIUM 80 MG/1
10 TABLET, FILM COATED ORAL AT BEDTIME
Refills: 0 | Status: DISCONTINUED | OUTPATIENT
Start: 2023-08-15 | End: 2023-08-19

## 2023-08-15 RX ORDER — METHADONE HYDROCHLORIDE 40 MG/1
85 TABLET ORAL DAILY
Refills: 0 | Status: DISCONTINUED | OUTPATIENT
Start: 2023-08-15 | End: 2023-08-15

## 2023-08-15 RX ORDER — EMTRICITABINE AND TENOFOVIR DISOPROXIL FUMARATE 200; 300 MG/1; MG/1
1 TABLET, FILM COATED ORAL
Refills: 0 | DISCHARGE

## 2023-08-15 RX ORDER — METHADONE HYDROCHLORIDE 40 MG/1
85 TABLET ORAL DAILY
Refills: 0 | Status: DISCONTINUED | OUTPATIENT
Start: 2023-08-15 | End: 2023-08-16

## 2023-08-15 RX ORDER — TIOTROPIUM BROMIDE 18 UG/1
2 CAPSULE ORAL; RESPIRATORY (INHALATION) DAILY
Refills: 0 | Status: DISCONTINUED | OUTPATIENT
Start: 2023-08-15 | End: 2023-08-19

## 2023-08-15 RX ORDER — BUDESONIDE AND FORMOTEROL FUMARATE DIHYDRATE 160; 4.5 UG/1; UG/1
2 AEROSOL RESPIRATORY (INHALATION)
Refills: 0 | Status: DISCONTINUED | OUTPATIENT
Start: 2023-08-15 | End: 2023-08-19

## 2023-08-15 RX ADMIN — TAMSULOSIN HYDROCHLORIDE 0.8 MILLIGRAM(S): 0.4 CAPSULE ORAL at 22:18

## 2023-08-15 RX ADMIN — ATORVASTATIN CALCIUM 10 MILLIGRAM(S): 80 TABLET, FILM COATED ORAL at 22:18

## 2023-08-15 RX ADMIN — EMTRICITABINE AND TENOFOVIR DISOPROXIL FUMARATE 1 TABLET(S): 200; 300 TABLET, FILM COATED ORAL at 13:48

## 2023-08-15 RX ADMIN — Medication 40 MILLIGRAM(S): at 04:49

## 2023-08-15 RX ADMIN — BUDESONIDE AND FORMOTEROL FUMARATE DIHYDRATE 2 PUFF(S): 160; 4.5 AEROSOL RESPIRATORY (INHALATION) at 06:46

## 2023-08-15 RX ADMIN — RIVAROXABAN 20 MILLIGRAM(S): KIT at 18:31

## 2023-08-15 RX ADMIN — Medication 40 MILLIGRAM(S): at 18:31

## 2023-08-15 RX ADMIN — METHADONE HYDROCHLORIDE 85 MILLIGRAM(S): 40 TABLET ORAL at 17:15

## 2023-08-15 RX ADMIN — Medication 40 MILLIEQUIVALENT(S): at 13:48

## 2023-08-15 RX ADMIN — Medication 40 MILLIEQUIVALENT(S): at 06:46

## 2023-08-15 RX ADMIN — Medication 25 GRAM(S): at 06:47

## 2023-08-15 RX ADMIN — BUDESONIDE AND FORMOTEROL FUMARATE DIHYDRATE 2 PUFF(S): 160; 4.5 AEROSOL RESPIRATORY (INHALATION) at 18:31

## 2023-08-15 RX ADMIN — DOLUTEGRAVIR SODIUM 50 MILLIGRAM(S): 25 TABLET, FILM COATED ORAL at 13:48

## 2023-08-15 RX ADMIN — LOSARTAN POTASSIUM 50 MILLIGRAM(S): 100 TABLET, FILM COATED ORAL at 04:49

## 2023-08-15 RX ADMIN — TIOTROPIUM BROMIDE 2 PUFF(S): 18 CAPSULE ORAL; RESPIRATORY (INHALATION) at 16:33

## 2023-08-15 NOTE — DIETITIAN NUTRITION RISK NOTIFICATION - TREATMENT: THE FOLLOWING DIET HAS BEEN RECOMMENDED
Diet, DASH/TLC:   Sodium & Cholesterol Restricted  1000mL Fluid Restriction ([id number]) (08-15-23 @ 03:46) [Active]

## 2023-08-15 NOTE — PATIENT PROFILE ADULT - FALL HARM RISK - HARM RISK INTERVENTIONS
Assistance with ambulation/Assistance OOB with selected safe patient handling equipment/Communicate Risk of Fall with Harm to all staff/Discuss with provider need for PT consult/Monitor gait and stability/Provide patient with walking aids - walker, cane, crutches/Reinforce activity limits and safety measures with patient and family/Review medications for side effects contributing to fall risk/Sit up slowly, dangle for a short time, stand at bedside before walking/Tailored Fall Risk Interventions/Toileting schedule using arm’s reach rule for commode and bathroom/Visual Cue: Yellow wristband and red socks/Bed in lowest position, wheels locked, appropriate side rails in place/Call bell, personal items and telephone in reach/Instruct patient to call for assistance before getting out of bed or chair/Non-slip footwear when patient is out of bed/[place] to call system/Physically safe environment - no spills, clutter or unnecessary equipment/Purposeful Proactive Rounding/Room/bathroom lighting operational, light cord in reach

## 2023-08-15 NOTE — DIETITIAN INITIAL EVALUATION ADULT - OTHER CALCULATIONS
Based on Standards of Care pt within % IBW (108%) however pt is edematous and ideal body weight used for all calculations. Needs adjusted for advanced age and malnutrition. Fluid recs per team.  Based on Standards of Care pt within % IBW (108%) thus actual body weight used for all calculations. Needs adjusted for advanced age and malnutrition. Fluid recs per team.

## 2023-08-15 NOTE — PROGRESS NOTE ADULT - PROBLEM SELECTOR PLAN 8
continue HOME MED: Tamsulosin 0.8mg PO daily      N: DASH with 1 liter fluid restriction  E: Maintain K>4.0 and Mg >2.0  VTE PPx: on Xarelto  Code: Full continue HOME MED: Tamsulosin 0.8mg PO daily

## 2023-08-15 NOTE — PHYSICAL THERAPY INITIAL EVALUATION ADULT - ADDITIONAL COMMENTS
Patient lives alone in an apartment with elevator and a ramp to enter the building. Uses a cane to ambulate. Bathroom set up with walk-in shower and has a shower chair and hand rails. HHA 2x/week for 4 hours. Independent with all ADLs.

## 2023-08-15 NOTE — PROGRESS NOTE ADULT - PROBLEM SELECTOR PLAN 6
undetectable VL per patient, will continue HOME MEDS: Descovy 200/25mg PO daily and Tivicay  50 mg PO daily.

## 2023-08-15 NOTE — DIETITIAN INITIAL EVALUATION ADULT - ENERGY INTAKE
Fair (50-75%) Pt is tolerating current diet: DASH/TLC, 1000ml Fluid restriction. States that this morning he consumed 50-75% of his breakfast.

## 2023-08-15 NOTE — PROGRESS NOTE ADULT - PROBLEM SELECTOR PLAN 2
hx of LLE DVT and PE diagnosed in 8/2020. Recommended to be on lifelong anticoagulation per patient, unclear if he has had hypercoagulable work-up.  -- will continue anticoagulation with HOME MED: Xarelto 20mg PO daily.

## 2023-08-15 NOTE — DIETITIAN NUTRITION RISK NOTIFICATION - ADDITIONAL COMMENTS/DIETITIAN RECOMMENDATIONS
Acute Severe malnutrition related to inability to meet physiological demands as evidenced by severe muscle and moderate fat depletions    1. Continue current diet as tolerated: DASH/TLC, 1000ml Fluid restriction. Fluids deferred to medical team. Diet texture/fluid consistencies based on SLP recommendations.   2. Recommend Ensure High Protein x2/day (350 kcal, 20 g protein in each) to optimize intake   3. Encourage PO intake and honor food preferences as able unless otherwise contraindicated.   4. Monitor PO intake, diet tolerance, weight trends, labs, and skin integrity and bowel movement regularity.   5. RD remains available upon request and will follow-up per protocol.

## 2023-08-15 NOTE — DIETITIAN INITIAL EVALUATION ADULT - NS FNS DIET ORDER
Diet, DASH/TLC:   Sodium & Cholesterol Restricted  1000mL Fluid Restriction ([id number]) (08-15-23 @ 03:46)

## 2023-08-15 NOTE — DIETITIAN INITIAL EVALUATION ADULT - OTHER INFO
- Nutritionally Pertinent Labs 8/15: K: 3.4 (L), HDL: 36 (L), A1C: 5.5%   - Pt is currently being diuresed with furosemide

## 2023-08-15 NOTE — DIETITIAN INITIAL EVALUATION ADULT - PERTINENT LABORATORY DATA
08-15    139  |  98  |  8   ----------------------------<  73  3.4<L>   |  33<H>  |  0.99    Ca    9.1      15 Aug 2023 03:45  Phos  3.4     08-15  Mg     1.6     08-15    TPro  7.9  /  Alb  3.2<L>  /  TBili  1.0  /  DBili  x   /  AST  32  /  ALT  22  /  AlkPhos  62  08-14  A1C with Estimated Average Glucose Result: 5.5 % (08-15-23 @ 03:45)

## 2023-08-15 NOTE — PROGRESS NOTE ADULT - REASON FOR ADMISSION
acute on chronic HFpEF exacerbation in setting of medication noncompliance and suspected CAP. acute on chronic HFpEF exacerbation in setting of medication noncompliance.

## 2023-08-15 NOTE — PROGRESS NOTE ADULT - SUBJECTIVE AND OBJECTIVE BOX
**INCOMPLETE NOTE    OVERNIGHT EVENTS:    SUBJECTIVE:  Patient seen and examined at bedside.    Vital Signs Last 12 Hrs  T(F): 97.9 (08-15-23 @ 04:36), Max: 98.2 (08-14-23 @ 22:16)  HR: 63 (08-15-23 @ 06:39) (63 - 73)  BP: 121/77 (08-15-23 @ 06:39) (121/77 - 178/91)  BP(mean): 95 (08-15-23 @ 06:39) (95 - 120)  RR: 16 (08-15-23 @ 06:39) (16 - 18)  SpO2: 99% (08-15-23 @ 06:39) (96% - 100%)  I&O's Summary    14 Aug 2023 07:01  -  15 Aug 2023 07:00  --------------------------------------------------------  IN: 0 mL / OUT: 1300 mL / NET: -1300 mL        PHYSICAL EXAM:  Constitutional: NAD, comfortable in bed.  HEENT: NC/AT, PERRLA, EOMI, no conjunctival pallor or scleral icterus, MMM  Neck: Supple, no JVD  Respiratory: CTA B/L. No w/r/r.   Cardiovascular: RRR, normal S1 and S2, no m/r/g.   Gastrointestinal: +BS, soft NTND, no guarding or rebound tenderness, no palpable masses   Extremities: wwp; no cyanosis, clubbing or edema.   Vascular: Pulses equal and strong throughout.   Neurological: AAOx3, no CN deficits, strength and sensation intact throughout.   Skin: No gross skin abnormalities or rashes        LABS:                        13.5   6.99  )-----------( 239      ( 15 Aug 2023 03:45 )             40.5     08-15    139  |  98  |  8   ----------------------------<  73  3.4<L>   |  33<H>  |  0.99    Ca    9.1      15 Aug 2023 03:45  Mg     1.6     08-15    TPro  7.9  /  Alb  3.2<L>  /  TBili  1.0  /  DBili  x   /  AST  32  /  ALT  22  /  AlkPhos  62  08-14    PT/INR - ( 15 Aug 2023 03:45 )   PT: 17.0 sec;   INR: 1.51          PTT - ( 15 Aug 2023 03:45 )  PTT:38.3 sec  Urinalysis Basic - ( 15 Aug 2023 03:45 )    Color: x / Appearance: x / SG: x / pH: x  Gluc: 73 mg/dL / Ketone: x  / Bili: x / Urobili: x   Blood: x / Protein: x / Nitrite: x   Leuk Esterase: x / RBC: x / WBC x   Sq Epi: x / Non Sq Epi: x / Bacteria: x          RADIOLOGY & ADDITIONAL TESTS:    MEDICATIONS  (STANDING):  atorvastatin 10 milliGRAM(s) Oral at bedtime  budesonide 160 MICROgram(s)/formoterol 4.5 MICROgram(s) Inhaler 2 Puff(s) Inhalation two times a day  dolutegravir 50 milliGRAM(s) Oral daily  emtricitabine 200 mG/tenofovir alafenamide 25 mG (DESCOVY) Tablet 1 Tablet(s) Oral daily  furosemide   Injectable 40 milliGRAM(s) IV Push every 12 hours  losartan 50 milliGRAM(s) Oral daily  magnesium sulfate  IVPB 2 Gram(s) IV Intermittent once  potassium chloride    Tablet ER 40 milliEquivalent(s) Oral every 4 hours  rivaroxaban 20 milliGRAM(s) Oral with dinner  tamsulosin 0.8 milliGRAM(s) Oral at bedtime  tiotropium 2.5 MICROgram(s) Inhaler 2 Puff(s) Inhalation daily    MEDICATIONS  (PRN):  albuterol    90 MICROgram(s) HFA Inhaler 2 Puff(s) Inhalation every 6 hours PRN Shortness of Breath and/or Wheezing   OVERNIGHT EVENTS: none to report    SUBJECTIVE:  Patient seen and examined at bedside. Found stable and in NAD while on NC 2L. Denies SOB, chest pain, fever, chills, or cough.    Vital Signs Last 12 Hrs  T(F): 97.9 (08-15-23 @ 04:36), Max: 98.2 (08-14-23 @ 22:16)  HR: 63 (08-15-23 @ 06:39) (63 - 73)  BP: 121/77 (08-15-23 @ 06:39) (121/77 - 178/91)  BP(mean): 95 (08-15-23 @ 06:39) (95 - 120)  RR: 16 (08-15-23 @ 06:39) (16 - 18)  SpO2: 99% (08-15-23 @ 06:39) (96% - 100%)  I&O's Summary    14 Aug 2023 07:01  -  15 Aug 2023 07:00  --------------------------------------------------------  IN: 0 mL / OUT: 1300 mL / NET: -1300 mL        PHYSICAL EXAM:  Constitutional: well-groomed; no distress  Eyes: PERRL; EOMI; conjunctiva clear  Respiratory: diffuse fine crackles noted from mid lobes to bilateral bases  Cardiovascular	regular rate and rhythm: S1 S2 present; no murmur; JVD; peripheral edema; bilateral non-pitting edema.  Gastrointestinal: soft; nontender; nondistended; normal active bowel sounds  Neurological: cranial nerves II-XII intact; sensation intact  Skin: warm and dry; color normal; no rashes; no ulcers  Psychiatric: normal affect; alert and oriented x3; normal behavior        LABS:                        13.5   6.99  )-----------( 239      ( 15 Aug 2023 03:45 )             40.5     08-15    139  |  98  |  8   ----------------------------<  73  3.4<L>   |  33<H>  |  0.99    Ca    9.1      15 Aug 2023 03:45  Mg     1.6     08-15    TPro  7.9  /  Alb  3.2<L>  /  TBili  1.0  /  DBili  x   /  AST  32  /  ALT  22  /  AlkPhos  62  08-14    PT/INR - ( 15 Aug 2023 03:45 )   PT: 17.0 sec;   INR: 1.51          PTT - ( 15 Aug 2023 03:45 )  PTT:38.3 sec  Urinalysis Basic - ( 15 Aug 2023 03:45 )    Color: x / Appearance: x / SG: x / pH: x  Gluc: 73 mg/dL / Ketone: x  / Bili: x / Urobili: x   Blood: x / Protein: x / Nitrite: x   Leuk Esterase: x / RBC: x / WBC x   Sq Epi: x / Non Sq Epi: x / Bacteria: x          RADIOLOGY & ADDITIONAL TESTS:    MEDICATIONS  (STANDING):  atorvastatin 10 milliGRAM(s) Oral at bedtime  budesonide 160 MICROgram(s)/formoterol 4.5 MICROgram(s) Inhaler 2 Puff(s) Inhalation two times a day  dolutegravir 50 milliGRAM(s) Oral daily  emtricitabine 200 mG/tenofovir alafenamide 25 mG (DESCOVY) Tablet 1 Tablet(s) Oral daily  furosemide   Injectable 40 milliGRAM(s) IV Push every 12 hours  losartan 50 milliGRAM(s) Oral daily  magnesium sulfate  IVPB 2 Gram(s) IV Intermittent once  potassium chloride    Tablet ER 40 milliEquivalent(s) Oral every 4 hours  rivaroxaban 20 milliGRAM(s) Oral with dinner  tamsulosin 0.8 milliGRAM(s) Oral at bedtime  tiotropium 2.5 MICROgram(s) Inhaler 2 Puff(s) Inhalation daily    MEDICATIONS  (PRN):  albuterol    90 MICROgram(s) HFA Inhaler 2 Puff(s) Inhalation every 6 hours PRN Shortness of Breath and/or Wheezing

## 2023-08-15 NOTE — DIETITIAN INITIAL EVALUATION ADULT - PERTINENT MEDS FT
MEDICATIONS  (STANDING):  atorvastatin 10 milliGRAM(s) Oral at bedtime  budesonide 160 MICROgram(s)/formoterol 4.5 MICROgram(s) Inhaler 2 Puff(s) Inhalation two times a day  dolutegravir 50 milliGRAM(s) Oral daily  emtricitabine 200 mG/tenofovir alafenamide 25 mG (DESCOVY) Tablet 1 Tablet(s) Oral daily  furosemide   Injectable 40 milliGRAM(s) IV Push every 12 hours  losartan 50 milliGRAM(s) Oral daily  methadone   Solution 85 milliGRAM(s) Oral daily  potassium chloride    Tablet ER 40 milliEquivalent(s) Oral every 4 hours  rivaroxaban 20 milliGRAM(s) Oral with dinner  tamsulosin 0.8 milliGRAM(s) Oral at bedtime  tiotropium 2.5 MICROgram(s) Inhaler 2 Puff(s) Inhalation daily    MEDICATIONS  (PRN):  albuterol    90 MICROgram(s) HFA Inhaler 2 Puff(s) Inhalation every 6 hours PRN Shortness of Breath and/or Wheezing

## 2023-08-15 NOTE — PROGRESS NOTE ADULT - PROBLEM SELECTOR PLAN 1
+JVD, bibasilar crackles, 2+ pitting edema bilaterally, sating 99% on 2LNC.  --CXR w/ pulmonary vascular congestion, interstitial edema. BNP 4032.  --received Lasix 40mg IV x 1 dose at [facility], home dose Lasix 20mg PO daily which patient has self discontinued.  --will continue Lasix 40mg IV q 12hrs, Losartan 50mg PO daily, strict I/Os and daily weights.  --obtain TTE in AM.      **Prior TTE 3/24/23: LVEF 60-65%, small focal area of hypokinesis in apical inferolateral wall. Dilated RV, septal flattening, PFO by color. MV leaflets are echodense and bileaflet prolapse.    **Recent [email or web address] 6/30/23: calcium score 0, mild annular calcification, mLAD intramyocardial bridge, grossly patent m/dLAD, pLCx, OM1, pRCA. Patient currently stable and asymptomatic on NC 2L satting >95%. Echo showed EF 60-65% unchanged from previous on 3/24/23. Will benefit from continued diuresis with Lasix 40mg BID IVP. Daily weights and strict I/Os monitoring. Fluid restriction to 1L daily.           - C/w Lasix 40mg BID IVP  - C/w Losartan 50mg PO QD   - Strict I/Os and daily weights.      **Prior TTE 3/24/23: LVEF 60-65%, small focal area of hypokinesis in apical inferolateral wall. Dilated RV, septal flattening, PFO by color. MV leaflets are echodense and bileaflet prolapse.    **Recent [email or web address] 6/30/23: calcium score 0, mild annular calcification, mLAD intramyocardial bridge, grossly patent m/dLAD, pLCx, OM1, pRCA.

## 2023-08-15 NOTE — DIETITIAN INITIAL EVALUATION ADULT - ADD RECOMMEND
1. Continue current diet as tolerated: DASH/TLC, 1000ml Fluid restriction. Fluids deferred to medical team. Diet texture/fluid consistencies based on SLP recommendations.   2. Recommend Ensure High Protein x2/day (350 kcal, 20 g protein in each) to optimize intake   3. Encourage PO intake and honor food preferences as able unless otherwise contraindicated.   4. Monitor PO intake, diet tolerance, weight trends, labs, and skin integrity and bowel movement regularity.   5. RD remains available upon request and will follow-up per protocol.

## 2023-08-15 NOTE — DIETITIAN INITIAL EVALUATION ADULT - PROBLEM SELECTOR PLAN 4
oral temp 99.5F upon arrival to [facility], slight leukocytosis w/ left shift. CXR without definitive consolidation. COVID/Flu/RSV negative. Blood cultures obtained.  --received Azithromycin 500mg IV x 1 dose and Ceftriaxone 1g IV x 1 dose at [facility].  --will F/U full RVP panel, Procalcitonin level and consider continuation of antibiotics.

## 2023-08-15 NOTE — ED ADULT NURSE REASSESSMENT NOTE - NS ED NURSE REASSESS COMMENT FT1
Received pt from previous RN, sleeping comfortably in stretcher and attached to cm and 3Lnc supp o2 therapy. Saturating at 100%. No distress noted. Pt is admitted and is currently awaiting available ambulance for transport to designated hospital.

## 2023-08-15 NOTE — PROGRESS NOTE ADULT - PROBLEM SELECTOR PLAN 7
former heroine addict, quit in 1998.  --currently on Methadone 95mg PO daily per patient, will need to verify dose with **[facility] [phone]. former heroine addict, quit in 1998. Currently on methadone 85mg QD PO.  - C/w methadone 85mg QD PO

## 2023-08-15 NOTE — DIETITIAN INITIAL EVALUATION ADULT - EDUCATION DIETARY MODIFICATIONS
Educated/Discussed the importance to prioritize protein at meal times. Reviewed good sources of protein on the menu. Discussed DASH diet education. Reviewed foods high in sodium and cholesterol to avoid. Reviewed ways to decrease sodium in your diet. Pt receptive and agreeable to education./(2) meets goals/outcomes/verbalization

## 2023-08-15 NOTE — DIETITIAN INITIAL EVALUATION ADULT - NSICDXPASTMEDICALHX_GEN_ALL_CORE_FT
PAST MEDICAL HISTORY:  Chronic heart failure with preserved ejection fraction (HFpEF)     COPD exacerbation     DVT, lower extremity     HIV infection     HLD (hyperlipidemia)     HTN (hypertension)     Pulmonary embolism

## 2023-08-15 NOTE — PROGRESS NOTE ADULT - PROBLEM SELECTOR PLAN 5
continue HOME MEDS: Ventolin HFA 90mcg/inh PRN, Symbicort 160/4.5mcg 2 puffs inh BID and Spiriva inhaled daily.

## 2023-08-15 NOTE — DIETITIAN INITIAL EVALUATION ADULT - NSFNSGIASSESSMENTFT_GEN_A_CORE
No issues with nausea, vomiting, diarrhea, constipation reported at time of visit. Last BM 8/13 per flow sheets. Abdominal appearance rounded, soft/nontender per flow sheets. Pt is currently not currently on any bowel regimen.

## 2023-08-15 NOTE — PROGRESS NOTE ADULT - PROBLEM SELECTOR PLAN 9
N: DASH with 1 liter fluid restriction. Per nutrition 2 ensures enlive QD  E: Maintain K>4.0 and Mg >2.0  VTE PPx: on Xarelto  Code: Full

## 2023-08-15 NOTE — PROGRESS NOTE ADULT - PROBLEM SELECTOR PLAN 4
oral temp 99.5F upon arrival to [facility], slight leukocytosis w/ left shift. CXR without definitive consolidation. COVID/Flu/RSV negative. Blood cultures obtained.  --received Azithromycin 500mg IV x 1 dose and Ceftriaxone 1g IV x 1 dose at [facility].  --will F/U full RVP panel, Procalcitonin level and consider continuation of antibiotics. (RESOLVED)  Patient currently afebrile and with CBC wnl. D/C'd antibiotics as no longer needed.

## 2023-08-15 NOTE — PROGRESS NOTE ADULT - PROBLEM SELECTOR PLAN 3
continue HOME MED: Atorvastatin 10mg PO qhs.  --F/U lipid panel Lipid panel wnl. Will continue home lipitor.    -C/w Atorvastatin 10mg PO qhs.

## 2023-08-15 NOTE — DIETITIAN INITIAL EVALUATION ADULT - PROBLEM SELECTOR PLAN 1
+JVD, bibasilar crackles, 2+ pitting edema bilaterally, sating 99% on 2LNC.  --CXR w/ pulmonary vascular congestion, interstitial edema. BNP 4032.  --received Lasix 40mg IV x 1 dose at [facility], home dose Lasix 20mg PO daily which patient has self discontinued.  --will continue Lasix 40mg IV q 12hrs, Losartan 50mg PO daily, strict I/Os and daily weights.  --obtain TTE in AM.      **Prior TTE 3/24/23: LVEF 60-65%, small focal area of hypokinesis in apical inferolateral wall. Dilated RV, septal flattening, PFO by color. MV leaflets are echodense and bileaflet prolapse.    **Recent [email or web address] 6/30/23: calcium score 0, mild annular calcification, mLAD intramyocardial bridge, grossly patent m/dLAD, pLCx, OM1, pRCA.

## 2023-08-15 NOTE — DIETITIAN INITIAL EVALUATION ADULT - REASON FOR ADMISSION
"63 yr old M chronic smoker, former heroine addict,  PMHx of HIV (on HAART since 2008, VL undetectable), COPD (not on home O2), hx of LLE DVT, PE (diagnosed 8/2020, on Xarelto), hyperlipidemia, chronic HFpEF (EF:65%), nonobstructive coronaries (by CCTA 6/30/23), BPH who presented to [facility] 8/14/23 c/o progressively worsening dyspnea on minimal exertion x few days,  transferred to [facility] cardiac telemetry for management of acute on chronic HFpEF exacerbation in setting of medication noncompliance and suspected CAP. "

## 2023-08-15 NOTE — DIETITIAN INITIAL EVALUATION ADULT - ORAL INTAKE PTA/DIET HISTORY
Visited pt at bedside. States that at home he has a good appetite. States that he follows a low salt diet at home. No cultural, ethnic, Spiritism food preferences noted. Confirms No known food allergies. States that he usually weighs ~180 pounds but feels as he has lost some weight within the past month, but unable to quantify the amount. Pt currently weighs 180 pounds (dosing).

## 2023-08-15 NOTE — DIETITIAN INITIAL EVALUATION ADULT - PROBLEM SELECTOR PLAN 7
former heroine addict, quit in 1998.  --currently on Methadone 95mg PO daily per patient, will need to verify dose with **[facility] [phone].

## 2023-08-16 LAB
ALBUMIN SERPL ELPH-MCNC: 3.6 G/DL — SIGNIFICANT CHANGE UP (ref 3.3–5)
ALP SERPL-CCNC: 66 U/L — SIGNIFICANT CHANGE UP (ref 40–120)
ALT FLD-CCNC: 13 U/L — SIGNIFICANT CHANGE UP (ref 10–45)
ANION GAP SERPL CALC-SCNC: 9 MMOL/L — SIGNIFICANT CHANGE UP (ref 5–17)
AST SERPL-CCNC: 24 U/L — SIGNIFICANT CHANGE UP (ref 10–40)
BASOPHILS # BLD AUTO: 0.02 K/UL — SIGNIFICANT CHANGE UP (ref 0–0.2)
BASOPHILS NFR BLD AUTO: 0.3 % — SIGNIFICANT CHANGE UP (ref 0–2)
BILIRUB SERPL-MCNC: 0.5 MG/DL — SIGNIFICANT CHANGE UP (ref 0.2–1.2)
BUN SERPL-MCNC: 11 MG/DL — SIGNIFICANT CHANGE UP (ref 7–23)
CALCIUM SERPL-MCNC: 9.6 MG/DL — SIGNIFICANT CHANGE UP (ref 8.4–10.5)
CHLORIDE SERPL-SCNC: 97 MMOL/L — SIGNIFICANT CHANGE UP (ref 96–108)
CO2 SERPL-SCNC: 35 MMOL/L — HIGH (ref 22–31)
CREAT SERPL-MCNC: 0.94 MG/DL — SIGNIFICANT CHANGE UP (ref 0.5–1.3)
EGFR: 90 ML/MIN/1.73M2 — SIGNIFICANT CHANGE UP
EOSINOPHIL # BLD AUTO: 0.22 K/UL — SIGNIFICANT CHANGE UP (ref 0–0.5)
EOSINOPHIL NFR BLD AUTO: 3.3 % — SIGNIFICANT CHANGE UP (ref 0–6)
GLUCOSE SERPL-MCNC: 120 MG/DL — HIGH (ref 70–99)
HCT VFR BLD CALC: 45.9 % — SIGNIFICANT CHANGE UP (ref 39–50)
HCV RNA FLD QL NAA+PROBE: SIGNIFICANT CHANGE UP
HCV RNA SPEC QL PROBE+SIG AMP: SIGNIFICANT CHANGE UP
HGB BLD-MCNC: 14.6 G/DL — SIGNIFICANT CHANGE UP (ref 13–17)
IMM GRANULOCYTES NFR BLD AUTO: 0.2 % — SIGNIFICANT CHANGE UP (ref 0–0.9)
LYMPHOCYTES # BLD AUTO: 2.26 K/UL — SIGNIFICANT CHANGE UP (ref 1–3.3)
LYMPHOCYTES # BLD AUTO: 34.4 % — SIGNIFICANT CHANGE UP (ref 13–44)
MAGNESIUM SERPL-MCNC: 1.8 MG/DL — SIGNIFICANT CHANGE UP (ref 1.6–2.6)
MCHC RBC-ENTMCNC: 31.7 PG — SIGNIFICANT CHANGE UP (ref 27–34)
MCHC RBC-ENTMCNC: 31.8 GM/DL — LOW (ref 32–36)
MCV RBC AUTO: 99.6 FL — SIGNIFICANT CHANGE UP (ref 80–100)
MONOCYTES # BLD AUTO: 0.85 K/UL — SIGNIFICANT CHANGE UP (ref 0–0.9)
MONOCYTES NFR BLD AUTO: 12.9 % — SIGNIFICANT CHANGE UP (ref 2–14)
NEUTROPHILS # BLD AUTO: 3.21 K/UL — SIGNIFICANT CHANGE UP (ref 1.8–7.4)
NEUTROPHILS NFR BLD AUTO: 48.9 % — SIGNIFICANT CHANGE UP (ref 43–77)
NRBC # BLD: 0 /100 WBCS — SIGNIFICANT CHANGE UP (ref 0–0)
PHOSPHATE SERPL-MCNC: 3.6 MG/DL — SIGNIFICANT CHANGE UP (ref 2.5–4.5)
PLATELET # BLD AUTO: 284 K/UL — SIGNIFICANT CHANGE UP (ref 150–400)
POTASSIUM SERPL-MCNC: 4 MMOL/L — SIGNIFICANT CHANGE UP (ref 3.5–5.3)
POTASSIUM SERPL-SCNC: 4 MMOL/L — SIGNIFICANT CHANGE UP (ref 3.5–5.3)
PROT SERPL-MCNC: 8 G/DL — SIGNIFICANT CHANGE UP (ref 6–8.3)
RBC # BLD: 4.61 M/UL — SIGNIFICANT CHANGE UP (ref 4.2–5.8)
RBC # FLD: 14.9 % — HIGH (ref 10.3–14.5)
SODIUM SERPL-SCNC: 141 MMOL/L — SIGNIFICANT CHANGE UP (ref 135–145)
WBC # BLD: 6.57 K/UL — SIGNIFICANT CHANGE UP (ref 3.8–10.5)
WBC # FLD AUTO: 6.57 K/UL — SIGNIFICANT CHANGE UP (ref 3.8–10.5)

## 2023-08-16 PROCEDURE — 99232 SBSQ HOSP IP/OBS MODERATE 35: CPT

## 2023-08-16 RX ORDER — SPIRONOLACTONE 25 MG/1
25 TABLET, FILM COATED ORAL DAILY
Refills: 0 | Status: DISCONTINUED | OUTPATIENT
Start: 2023-08-16 | End: 2023-08-18

## 2023-08-16 RX ORDER — POLYETHYLENE GLYCOL 3350 17 G/17G
17 POWDER, FOR SOLUTION ORAL
Refills: 0 | Status: DISCONTINUED | OUTPATIENT
Start: 2023-08-16 | End: 2023-08-19

## 2023-08-16 RX ORDER — MAGNESIUM OXIDE 400 MG ORAL TABLET 241.3 MG
400 TABLET ORAL ONCE
Refills: 0 | Status: COMPLETED | OUTPATIENT
Start: 2023-08-16 | End: 2023-08-16

## 2023-08-16 RX ORDER — METHADONE HYDROCHLORIDE 40 MG/1
85 TABLET ORAL DAILY
Refills: 0 | Status: DISCONTINUED | OUTPATIENT
Start: 2023-08-16 | End: 2023-08-19

## 2023-08-16 RX ORDER — SENNA PLUS 8.6 MG/1
2 TABLET ORAL AT BEDTIME
Refills: 0 | Status: DISCONTINUED | OUTPATIENT
Start: 2023-08-16 | End: 2023-08-19

## 2023-08-16 RX ADMIN — TIOTROPIUM BROMIDE 2 PUFF(S): 18 CAPSULE ORAL; RESPIRATORY (INHALATION) at 12:38

## 2023-08-16 RX ADMIN — LOSARTAN POTASSIUM 50 MILLIGRAM(S): 100 TABLET, FILM COATED ORAL at 07:43

## 2023-08-16 RX ADMIN — TAMSULOSIN HYDROCHLORIDE 0.8 MILLIGRAM(S): 0.4 CAPSULE ORAL at 21:57

## 2023-08-16 RX ADMIN — SPIRONOLACTONE 25 MILLIGRAM(S): 25 TABLET, FILM COATED ORAL at 12:38

## 2023-08-16 RX ADMIN — METHADONE HYDROCHLORIDE 85 MILLIGRAM(S): 40 TABLET ORAL at 13:22

## 2023-08-16 RX ADMIN — MAGNESIUM OXIDE 400 MG ORAL TABLET 400 MILLIGRAM(S): 241.3 TABLET ORAL at 09:45

## 2023-08-16 RX ADMIN — SENNA PLUS 2 TABLET(S): 8.6 TABLET ORAL at 21:57

## 2023-08-16 RX ADMIN — BUDESONIDE AND FORMOTEROL FUMARATE DIHYDRATE 2 PUFF(S): 160; 4.5 AEROSOL RESPIRATORY (INHALATION) at 18:27

## 2023-08-16 RX ADMIN — EMTRICITABINE AND TENOFOVIR DISOPROXIL FUMARATE 1 TABLET(S): 200; 300 TABLET, FILM COATED ORAL at 12:38

## 2023-08-16 RX ADMIN — RIVAROXABAN 20 MILLIGRAM(S): KIT at 17:00

## 2023-08-16 RX ADMIN — BUDESONIDE AND FORMOTEROL FUMARATE DIHYDRATE 2 PUFF(S): 160; 4.5 AEROSOL RESPIRATORY (INHALATION) at 07:43

## 2023-08-16 RX ADMIN — POLYETHYLENE GLYCOL 3350 17 GRAM(S): 17 POWDER, FOR SOLUTION ORAL at 09:45

## 2023-08-16 RX ADMIN — DOLUTEGRAVIR SODIUM 50 MILLIGRAM(S): 25 TABLET, FILM COATED ORAL at 12:38

## 2023-08-16 RX ADMIN — Medication 40 MILLIGRAM(S): at 06:24

## 2023-08-16 RX ADMIN — ATORVASTATIN CALCIUM 10 MILLIGRAM(S): 80 TABLET, FILM COATED ORAL at 21:57

## 2023-08-16 RX ADMIN — Medication 40 MILLIGRAM(S): at 17:00

## 2023-08-16 NOTE — OCCUPATIONAL THERAPY INITIAL EVALUATION ADULT - MD ORDER
Acute on chronic heart failure with preserved ejection fraction (HFpEF) in the setting of medication noncompliance.

## 2023-08-16 NOTE — OCCUPATIONAL THERAPY INITIAL EVALUATION ADULT - PERTINENT HX OF CURRENT PROBLEM, REHAB EVAL
63 yr old M chronic smoker, former heroine addict,  PMHx of HIV (on HAART since 2008, VL undetectable), COPD (not on home O2), hx of LLE DVT, PE (diagnosed 8/2020, on Xarelto), hyperlipidemia, chronic HFpEF (EF:65%), nonobstructive coronaries (by CCTA 6/30/23), BPH who presented to [facility] 8/14/23 c/o progressively worsening dyspnea on minimal exertion x few days,  transferred to [facility] cardiac telemetry for management of acute on chronic HFpEF exacerbation in setting of medication noncompliance and suspected CAP.

## 2023-08-16 NOTE — PROGRESS NOTE ADULT - SUBJECTIVE AND OBJECTIVE BOX
**INCOMPLETE NOTE    OVERNIGHT EVENTS:    SUBJECTIVE:  Patient seen and examined at bedside.    Vital Signs Last 12 Hrs  T(F): 97.4 (08-16-23 @ 06:23), Max: 97.9 (08-15-23 @ 22:16)  HR: 54 (08-16-23 @ 06:23) (54 - 62)  BP: 131/82 (08-16-23 @ 06:23) (112/70 - 147/90)  BP(mean): 102 (08-16-23 @ 06:23) (86 - 111)  RR: 17 (08-16-23 @ 06:23) (16 - 18)  SpO2: 95% (08-16-23 @ 06:23) (95% - 98%)  I&O's Summary    15 Aug 2023 07:01  -  16 Aug 2023 07:00  --------------------------------------------------------  IN: 420 mL / OUT: 1600 mL / NET: -1180 mL        PHYSICAL EXAM:  Constitutional: NAD, comfortable in bed.  HEENT: NC/AT, PERRLA, EOMI, no conjunctival pallor or scleral icterus, MMM  Neck: Supple, no JVD  Respiratory: CTA B/L. No w/r/r.   Cardiovascular: RRR, normal S1 and S2, no m/r/g.   Gastrointestinal: +BS, soft NTND, no guarding or rebound tenderness, no palpable masses   Extremities: wwp; no cyanosis, clubbing or edema.   Vascular: Pulses equal and strong throughout.   Neurological: AAOx3, no CN deficits, strength and sensation intact throughout.   Skin: No gross skin abnormalities or rashes        LABS:                        13.5   6.99  )-----------( 239      ( 15 Aug 2023 03:45 )             40.5     08-15    139  |  98  |  8   ----------------------------<  73  3.4<L>   |  33<H>  |  0.99    Ca    9.1      15 Aug 2023 03:45  Phos  3.4     08-15  Mg     1.6     08-15    TPro  7.9  /  Alb  3.2<L>  /  TBili  1.0  /  DBili  x   /  AST  32  /  ALT  22  /  AlkPhos  62  08-14    PT/INR - ( 15 Aug 2023 03:45 )   PT: 17.0 sec;   INR: 1.51          PTT - ( 15 Aug 2023 03:45 )  PTT:38.3 sec  Urinalysis Basic - ( 15 Aug 2023 03:45 )    Color: x / Appearance: x / SG: x / pH: x  Gluc: 73 mg/dL / Ketone: x  / Bili: x / Urobili: x   Blood: x / Protein: x / Nitrite: x   Leuk Esterase: x / RBC: x / WBC x   Sq Epi: x / Non Sq Epi: x / Bacteria: x          RADIOLOGY & ADDITIONAL TESTS:    MEDICATIONS  (STANDING):  atorvastatin 10 milliGRAM(s) Oral at bedtime  budesonide 160 MICROgram(s)/formoterol 4.5 MICROgram(s) Inhaler 2 Puff(s) Inhalation two times a day  dolutegravir 50 milliGRAM(s) Oral daily  emtricitabine 200 mG/tenofovir alafenamide 25 mG (DESCOVY) Tablet 1 Tablet(s) Oral daily  furosemide   Injectable 40 milliGRAM(s) IV Push every 12 hours  losartan 50 milliGRAM(s) Oral daily  methadone    Tablet 85 milliGRAM(s) Oral daily  rivaroxaban 20 milliGRAM(s) Oral with dinner  tamsulosin 0.8 milliGRAM(s) Oral at bedtime  tiotropium 2.5 MICROgram(s) Inhaler 2 Puff(s) Inhalation daily    MEDICATIONS  (PRN):  albuterol    90 MICROgram(s) HFA Inhaler 2 Puff(s) Inhalation every 6 hours PRN Shortness of Breath and/or Wheezing   OVERNIGHT EVENTS: none to report     SUBJECTIVE:  Patient seen and examined at bedside. Found stable and in NAD. Endorses improved SOB on NC 1L and continued urination without difficulties. Denies chest pain.    Vital Signs Last 12 Hrs  T(F): 97.4 (08-16-23 @ 06:23), Max: 97.9 (08-15-23 @ 22:16)  HR: 54 (08-16-23 @ 06:23) (54 - 62)  BP: 131/82 (08-16-23 @ 06:23) (112/70 - 147/90)  BP(mean): 102 (08-16-23 @ 06:23) (86 - 111)  RR: 17 (08-16-23 @ 06:23) (16 - 18)  SpO2: 95% (08-16-23 @ 06:23) (95% - 98%)  I&O's Summary    15 Aug 2023 07:01  -  16 Aug 2023 07:00  --------------------------------------------------------  IN: 420 mL / OUT: 1600 mL / NET: -1180 mL        PHYSICAL EXAM:  Constitutional: well-groomed; no distress  Eyes: PERRL; EOMI; conjunctiva clear  Respiratory: diffuse fine crackles noted from mid lobes to bilateral bases. Improved from last physical examn  Cardiovascular	regular rate and rhythm: S1 S2 present; no murmur; JVD; peripheral edema; bilateral non-pitting edema.  Gastrointestinal: soft; nontender; nondistended; normal active bowel sounds  Neurological: cranial nerves II-XII intact; sensation intact  Skin: warm and dry; color normal; no rashes; no ulcers  Psychiatric: normal affect; alert and oriented x3; normal behavior        LABS:                        13.5   6.99  )-----------( 239      ( 15 Aug 2023 03:45 )             40.5     08-15    139  |  98  |  8   ----------------------------<  73  3.4<L>   |  33<H>  |  0.99    Ca    9.1      15 Aug 2023 03:45  Phos  3.4     08-15  Mg     1.6     08-15    TPro  7.9  /  Alb  3.2<L>  /  TBili  1.0  /  DBili  x   /  AST  32  /  ALT  22  /  AlkPhos  62  08-14    PT/INR - ( 15 Aug 2023 03:45 )   PT: 17.0 sec;   INR: 1.51          PTT - ( 15 Aug 2023 03:45 )  PTT:38.3 sec  Urinalysis Basic - ( 15 Aug 2023 03:45 )    Color: x / Appearance: x / SG: x / pH: x  Gluc: 73 mg/dL / Ketone: x  / Bili: x / Urobili: x   Blood: x / Protein: x / Nitrite: x   Leuk Esterase: x / RBC: x / WBC x   Sq Epi: x / Non Sq Epi: x / Bacteria: x          RADIOLOGY & ADDITIONAL TESTS:    MEDICATIONS  (STANDING):  atorvastatin 10 milliGRAM(s) Oral at bedtime  budesonide 160 MICROgram(s)/formoterol 4.5 MICROgram(s) Inhaler 2 Puff(s) Inhalation two times a day  dolutegravir 50 milliGRAM(s) Oral daily  emtricitabine 200 mG/tenofovir alafenamide 25 mG (DESCOVY) Tablet 1 Tablet(s) Oral daily  furosemide   Injectable 40 milliGRAM(s) IV Push every 12 hours  losartan 50 milliGRAM(s) Oral daily  methadone    Tablet 85 milliGRAM(s) Oral daily  rivaroxaban 20 milliGRAM(s) Oral with dinner  tamsulosin 0.8 milliGRAM(s) Oral at bedtime  tiotropium 2.5 MICROgram(s) Inhaler 2 Puff(s) Inhalation daily    MEDICATIONS  (PRN):  albuterol    90 MICROgram(s) HFA Inhaler 2 Puff(s) Inhalation every 6 hours PRN Shortness of Breath and/or Wheezing

## 2023-08-16 NOTE — OCCUPATIONAL THERAPY INITIAL EVALUATION ADULT - GENERAL OBSERVATIONS, REHAB EVAL
Pt's RN Millicent aware of intent to eval/tx; cleared Pt. Pt received in supine - +telemetry, heplock, 02 via NC 1/L. Pt agreeable to OT.

## 2023-08-16 NOTE — OCCUPATIONAL THERAPY INITIAL EVALUATION ADULT - ADDITIONAL COMMENTS
Patient lives alone in a supportive housing-apartment building with elevator access, no bakari building/ramp access. Patient ambulates with cane/rollator, and reports additional dme of grab bars and shower chair. Patient completes ADL's independently, and has assistance with IADL's from A (2dys/4hrs).

## 2023-08-16 NOTE — PROGRESS NOTE ADULT - ATTENDING COMMENTS
I saw, evaluated, and examined the patient at the bedside. I discussed the patient’s case with the resident and agree with resident’s note, ROS findings, physical exam, assessment, and plan as documented in the resident’s note, with the following addendum.    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    Assessment:  1) Acute on chronic decompensated HFpEF exacerbation - remaining decompensated  2) Unprovoked DVT and PE on lifelong AC w/ xarelto  3) HIV on HAART    Plan  1) IV lasix 40mg BID for one more day w/ strict I/O and daily standing weight  2) Switch losartan 50mg daily to aldactone 25mg daily given HFpEF and HTN, normal Cr and potassium  3) C/w AC with xarelto at home dose  4) HAART at home dose    Total of 35 minutes were spent in reviewing the patient’s chart, examining the patient, and discussing patient’s assessment and plan.     Maxx Slade M.D.  CARDIOLOGY ATTENDING.

## 2023-08-16 NOTE — PROGRESS NOTE ADULT - PROBLEM SELECTOR PLAN 1
Patient currently stable and asymptomatic on NC 2L satting >95%. Echo showed EF 60-65% unchanged from previous on 3/24/23. Will benefit from continued diuresis with Lasix 40mg BID IVP. Daily weights and strict I/Os monitoring. Fluid restriction to 1L daily.           - C/w Lasix 40mg BID IVP  - C/w Losartan 50mg PO QD   - Strict I/Os and daily weights.      **Prior TTE 3/24/23: LVEF 60-65%, small focal area of hypokinesis in apical inferolateral wall. Dilated RV, septal flattening, PFO by color. MV leaflets are echodense and bileaflet prolapse.    **Recent CCTA@[facility] 6/30/23: calcium score 0, mild annular calcification, mLAD intramyocardial bridge, grossly patent m/dLAD, pLCx, OM1, pRCA. Patient currently stable and asymptomatic on NC 1L satting >95%. Improved SOB and reduced but persistent inspiratory crackels. Will benefit from continued diuresis with Lasix 40mg BID IVP. Daily weights and strict I/Os monitoring. Fluid restriction to 1L daily.           - C/w Lasix 40mg BID IVP. Goal net even fluid balance of -1 to -1.5L  - Switch Losartan 50mg QD PO to Aldactone 25mg QD PO  - Strict I/Os and daily weights.      **Prior TTE 3/24/23: LVEF 60-65%, small focal area of hypokinesis in apical inferolateral wall. Dilated RV, septal flattening, PFO by color. MV leaflets are echodense and bileaflet prolapse.    **Recent CCTA@[facility] 6/30/23: calcium score 0, mild annular calcification, mLAD intramyocardial bridge, grossly patent m/dLAD, pLCx, OM1, pRCA.

## 2023-08-17 LAB
ALBUMIN SERPL ELPH-MCNC: 3.9 G/DL — SIGNIFICANT CHANGE UP (ref 3.3–5)
ALP SERPL-CCNC: 75 U/L — SIGNIFICANT CHANGE UP (ref 40–120)
ALT FLD-CCNC: 16 U/L — SIGNIFICANT CHANGE UP (ref 10–45)
ANION GAP SERPL CALC-SCNC: 11 MMOL/L — SIGNIFICANT CHANGE UP (ref 5–17)
AST SERPL-CCNC: 25 U/L — SIGNIFICANT CHANGE UP (ref 10–40)
BASOPHILS # BLD AUTO: 0.02 K/UL — SIGNIFICANT CHANGE UP (ref 0–0.2)
BASOPHILS NFR BLD AUTO: 0.3 % — SIGNIFICANT CHANGE UP (ref 0–2)
BILIRUB SERPL-MCNC: 0.6 MG/DL — SIGNIFICANT CHANGE UP (ref 0.2–1.2)
BUN SERPL-MCNC: 14 MG/DL — SIGNIFICANT CHANGE UP (ref 7–23)
CALCIUM SERPL-MCNC: 10.3 MG/DL — SIGNIFICANT CHANGE UP (ref 8.4–10.5)
CHLORIDE SERPL-SCNC: 93 MMOL/L — LOW (ref 96–108)
CO2 SERPL-SCNC: 38 MMOL/L — HIGH (ref 22–31)
CREAT SERPL-MCNC: 1.09 MG/DL — SIGNIFICANT CHANGE UP (ref 0.5–1.3)
EGFR: 75 ML/MIN/1.73M2 — SIGNIFICANT CHANGE UP
EOSINOPHIL # BLD AUTO: 0.2 K/UL — SIGNIFICANT CHANGE UP (ref 0–0.5)
EOSINOPHIL NFR BLD AUTO: 2.7 % — SIGNIFICANT CHANGE UP (ref 0–6)
GLUCOSE SERPL-MCNC: 94 MG/DL — SIGNIFICANT CHANGE UP (ref 70–99)
HCT VFR BLD CALC: 50.1 % — HIGH (ref 39–50)
HGB BLD-MCNC: 16.2 G/DL — SIGNIFICANT CHANGE UP (ref 13–17)
IMM GRANULOCYTES NFR BLD AUTO: 0.1 % — SIGNIFICANT CHANGE UP (ref 0–0.9)
LYMPHOCYTES # BLD AUTO: 3.46 K/UL — HIGH (ref 1–3.3)
LYMPHOCYTES # BLD AUTO: 46.6 % — HIGH (ref 13–44)
MAGNESIUM SERPL-MCNC: 1.8 MG/DL — SIGNIFICANT CHANGE UP (ref 1.6–2.6)
MCHC RBC-ENTMCNC: 32.1 PG — SIGNIFICANT CHANGE UP (ref 27–34)
MCHC RBC-ENTMCNC: 32.3 GM/DL — SIGNIFICANT CHANGE UP (ref 32–36)
MCV RBC AUTO: 99.4 FL — SIGNIFICANT CHANGE UP (ref 80–100)
MONOCYTES # BLD AUTO: 0.76 K/UL — SIGNIFICANT CHANGE UP (ref 0–0.9)
MONOCYTES NFR BLD AUTO: 10.2 % — SIGNIFICANT CHANGE UP (ref 2–14)
NEUTROPHILS # BLD AUTO: 2.97 K/UL — SIGNIFICANT CHANGE UP (ref 1.8–7.4)
NEUTROPHILS NFR BLD AUTO: 40.1 % — LOW (ref 43–77)
NRBC # BLD: 0 /100 WBCS — SIGNIFICANT CHANGE UP (ref 0–0)
PHOSPHATE SERPL-MCNC: 3.5 MG/DL — SIGNIFICANT CHANGE UP (ref 2.5–4.5)
PLATELET # BLD AUTO: 270 K/UL — SIGNIFICANT CHANGE UP (ref 150–400)
POTASSIUM SERPL-MCNC: 4.1 MMOL/L — SIGNIFICANT CHANGE UP (ref 3.5–5.3)
POTASSIUM SERPL-SCNC: 4.1 MMOL/L — SIGNIFICANT CHANGE UP (ref 3.5–5.3)
PROT SERPL-MCNC: 8.9 G/DL — HIGH (ref 6–8.3)
RBC # BLD: 5.04 M/UL — SIGNIFICANT CHANGE UP (ref 4.2–5.8)
RBC # FLD: 14.9 % — HIGH (ref 10.3–14.5)
SODIUM SERPL-SCNC: 142 MMOL/L — SIGNIFICANT CHANGE UP (ref 135–145)
WBC # BLD: 7.42 K/UL — SIGNIFICANT CHANGE UP (ref 3.8–10.5)
WBC # FLD AUTO: 7.42 K/UL — SIGNIFICANT CHANGE UP (ref 3.8–10.5)

## 2023-08-17 RX ADMIN — Medication 40 MILLIGRAM(S): at 17:21

## 2023-08-17 RX ADMIN — ATORVASTATIN CALCIUM 10 MILLIGRAM(S): 80 TABLET, FILM COATED ORAL at 22:29

## 2023-08-17 RX ADMIN — RIVAROXABAN 20 MILLIGRAM(S): KIT at 17:22

## 2023-08-17 RX ADMIN — BUDESONIDE AND FORMOTEROL FUMARATE DIHYDRATE 2 PUFF(S): 160; 4.5 AEROSOL RESPIRATORY (INHALATION) at 07:27

## 2023-08-17 RX ADMIN — TIOTROPIUM BROMIDE 2 PUFF(S): 18 CAPSULE ORAL; RESPIRATORY (INHALATION) at 10:54

## 2023-08-17 RX ADMIN — METHADONE HYDROCHLORIDE 85 MILLIGRAM(S): 40 TABLET ORAL at 10:52

## 2023-08-17 RX ADMIN — POLYETHYLENE GLYCOL 3350 17 GRAM(S): 17 POWDER, FOR SOLUTION ORAL at 07:27

## 2023-08-17 RX ADMIN — DOLUTEGRAVIR SODIUM 50 MILLIGRAM(S): 25 TABLET, FILM COATED ORAL at 10:52

## 2023-08-17 RX ADMIN — EMTRICITABINE AND TENOFOVIR DISOPROXIL FUMARATE 1 TABLET(S): 200; 300 TABLET, FILM COATED ORAL at 10:52

## 2023-08-17 RX ADMIN — TAMSULOSIN HYDROCHLORIDE 0.8 MILLIGRAM(S): 0.4 CAPSULE ORAL at 22:29

## 2023-08-17 RX ADMIN — BUDESONIDE AND FORMOTEROL FUMARATE DIHYDRATE 2 PUFF(S): 160; 4.5 AEROSOL RESPIRATORY (INHALATION) at 17:22

## 2023-08-17 RX ADMIN — Medication 40 MILLIGRAM(S): at 07:27

## 2023-08-17 RX ADMIN — SPIRONOLACTONE 25 MILLIGRAM(S): 25 TABLET, FILM COATED ORAL at 07:26

## 2023-08-17 NOTE — PROGRESS NOTE ADULT - ATTENDING COMMENTS
I saw, evaluated, and examined the patient at the bedside. I discussed the patient’s case with the resident and agree with resident’s note, ROS findings, physical exam, assessment, and plan as documented in the resident’s note, with the following addendum.    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    Assessment:  1) Acute on chronic decompensated HFpEF exacerbation - remaining decompensated  2) Unprovoked DVT and PE on lifelong AC w/ xarelto  3) HIV on HAART  4) Tobacco use disorder  5) COPD    Plan  1) IV lasix 40mg BID for one more day w/ strict I/O and daily standing weight  2) C/w aldactone 25mg daily given HFpEF and HTN  3) C/w AC with xarelto at home dose  4) HAART at home dose    Total of 35 minutes were spent in reviewing the patient’s chart, examining the patient, and discussing patient’s assessment and plan.     Maxx Slade M.D.  CARDIOLOGY ATTENDING.

## 2023-08-17 NOTE — PROGRESS NOTE ADULT - PROBLEM SELECTOR PLAN 1
Patient currently stable and asymptomatic on NC 1L satting >95%. Improved SOB and reduced but persistent inspiratory crackels. Will benefit from continued diuresis with Lasix 40mg BID IVP. Daily weights and strict I/Os monitoring. Fluid restriction to 1L daily.           - C/w Lasix 40mg BID IVP. Goal net even fluid balance of -1 to -1.5L  - Switch Losartan 50mg QD PO to Aldactone 25mg QD PO  - Strict I/Os and daily weights.      **Prior TTE 3/24/23: LVEF 60-65%, small focal area of hypokinesis in apical inferolateral wall. Dilated RV, septal flattening, PFO by color. MV leaflets are echodense and bileaflet prolapse.    **Recent CCTA@[facility] 6/30/23: calcium score 0, mild annular calcification, mLAD intramyocardial bridge, grossly patent m/dLAD, pLCx, OM1, pRCA. Patient currently stable and asymptomatic on NC 1L satting >95%. Improved SOB and reduced but persistent inspiratory crackles. Will benefit from continued diuresis with Lasix 40mg BID IVP. Daily weights and strict I/Os monitoring. Fluid restriction to 1L daily.           - C/w Lasix 40mg BID IVP. Goal net even fluid balance of -1 to -1.5L  - C/w Aldactone 25mg QD PO  - Strict I/Os and daily weights.      **Prior TTE 3/24/23: LVEF 60-65%, small focal area of hypokinesis in apical inferolateral wall. Dilated RV, septal flattening, PFO by color. MV leaflets are echodense and bileaflet prolapse.    **Recent CCTA@[facility] 6/30/23: calcium score 0, mild annular calcification, mLAD intramyocardial bridge, grossly patent m/dLAD, pLCx, OM1, pRCA. Patient currently stable and asymptomatic on NC 1L satting >95%. Improved SOB and reduced but persistent inspiratory crackles. Will benefit from continued diuresis with Lasix 40mg BID IVP. Daily weights and strict I/Os monitoring. Fluid restriction to 1L daily. Maintain net even balance -1L to -2L       - C/w Lasix 40mg BID IVP. Goal net even fluid balance of -1 to -1.5L  - C/w Aldactone 25mg QD PO  - Strict I/Os and daily weights.      **Prior TTE 3/24/23: LVEF 60-65%, small focal area of hypokinesis in apical inferolateral wall. Dilated RV, septal flattening, PFO by color. MV leaflets are echodense and bileaflet prolapse.    **Recent CCTA@[facility] 6/30/23: calcium score 0, mild annular calcification, mLAD intramyocardial bridge, grossly patent m/dLAD, pLCx, OM1, pRCA.

## 2023-08-17 NOTE — CHART NOTE - NSCHARTNOTEFT_GEN_A_CORE
Admitting Diagnosis:   Patient is a 63y old  Male who presents with a chief complaint of acute on chronic HFpEF exacerbation in setting of medication noncompliance and suspected CAP. (17 Aug 2023 07:38)      PAST MEDICAL & SURGICAL HISTORY:  HIV infection      HTN (hypertension)      HLD (hyperlipidemia)      COPD exacerbation      Pulmonary embolism      DVT, lower extremity      Chronic heart failure with preserved ejection fraction (HFpEF)      No significant past surgical history          Current Nutrition Order:  DASH TLC, 1000ml fluid restriction, ensure enlive x2/day (350kcal/20gm prot per 1 can)     PO Intake: Good (%) [   ]  Fair (50-75%) [   ] Poor (<25%) [  X ]    GI Issues:   Denies N/V  Reports had been constipated  On  began bowel regimen  Reported BM+ this AM    Pain: Reported ongoing Pain During admit - no pain meds noted     Skin Integrity:  Mark 20  1+BL Ankle Edema  No pressure ulcers     Labs:       142  |  93<L>  |  14  ----------------------------<  94  4.1   |  38<H>  |  1.09    Ca    10.3      17 Aug 2023 08:44  Phos  3.5     08-17  Mg     1.8     08-    TPro  8.9<H>  /  Alb  3.9  /  TBili  0.6  /  DBili  x   /  AST  25  /  ALT  16  /  AlkPhos  75  08-17    CAPILLARY BLOOD GLUCOSE          Medications:  MEDICATIONS  (STANDING):  atorvastatin 10 milliGRAM(s) Oral at bedtime  budesonide 160 MICROgram(s)/formoterol 4.5 MICROgram(s) Inhaler 2 Puff(s) Inhalation two times a day  dolutegravir 50 milliGRAM(s) Oral daily  emtricitabine 200 mG/tenofovir alafenamide 25 mG (DESCOVY) Tablet 1 Tablet(s) Oral daily  furosemide   Injectable 40 milliGRAM(s) IV Push every 12 hours  methadone    Tablet 85 milliGRAM(s) Oral daily  polyethylene glycol 3350 17 Gram(s) Oral two times a day  rivaroxaban 20 milliGRAM(s) Oral with dinner  senna 2 Tablet(s) Oral at bedtime  spironolactone 25 milliGRAM(s) Oral daily  tamsulosin 0.8 milliGRAM(s) Oral at bedtime  tiotropium 2.5 MICROgram(s) Inhaler 2 Puff(s) Inhalation daily    MEDICATIONS  (PRN):  albuterol    90 MICROgram(s) HFA Inhaler 2 Puff(s) Inhalation every 6 hours PRN Shortness of Breath and/or Wheezing      5'10'' [id number] pounds +-10%   Wt 180 pounds BMI 25.9 %[id number]    Weight Change:   >>States that he usually weighs ~180 pounds but feels as he has lost some weight within the past month, but unable to quantify the amount. Pt dosing wt of 180 pounds. Daily wts are lower then admit wt. Assume on down trend in setting of CHF/ use of lasix/spironolactone.  () 171.2 pounds   () 171.5 pounds     **PCM:  >> Pt States that at home he has a good appetite usually - has decreased since becoming sick. Pt does report he is able to food shop, however gets food stmaps which do not always last the full month. ? If Meeting ~75% EER consistently PTA.   >> Pt with wasting/loss which ranges from mild-severe. Please see RD note 8/15 for full details on NFPE.     Estimated energy needs:   IBW for EER D/t Edema in CHF pt  Adjust for age, CHF, HIV and malnutrition; fluids per team   30-35kcal/k-2625kcal/day   1.2-1.4gm/k-105gm prot/day     Subjective: 63 yr old M chronic smoker, former heroine addict, PMHx HIV (on HAART since , VL undetectable), COPD (not on home O2), hx of LLE DVT, PE (diagnosed 2020, on Xarelto), hyperlipidemia, chronic HFpEF (EF:65%), nonobstructive coronaries (by CCTA 23), [facility] progressively worsening dyspnea on minimal exertion x few days, transferred to [facility] cardiac telemetry for management of acute on chronic HFpEF exacerbation in setting of medication noncompliance and suspected CAP.    Visited pt at bedside. Denies N/V. Reports had been consitpatied. On  began bowel regimen. Reported BM+ this AM. Diet: DASH TLC, 1000ml fluid restrction, ensure enlive x2/day (350kcal/20gm prot per 1 can). Reported decreased PO d/t Pain during admit. This AM consumed just cream of wheat during breakfast. Pt did not state if he has been taking PO supplmenets or not.   RD Recs below- Recs made with team.     Prior PES: Malnutrition... Acute Severe RT inability to meet physiological demands AEB severe muscle and moderate fat depletions and mild edema  >>ongoing  GOAL: Pt to meet >75% of estimated nutrition needs daily per course of hospitalization.    Recommendations:  - Current Diet: DASH TLC, 1000ml fluid restriction, ensure enlive x2/day (350kcal/20gm prot per 1 can).  - Monitor %PO intake, Diet tolerance.  - Continue with oral nutrition supplements.   - If PO does not improve in 48hrs: Recommend to liberalize diet in attempts to promote PO intake (liberalizing diet will allow for more menu options).  - Labs: monitor BMP, CBC, glucose, lytes, trend renal indices, LFTs.  - Pain/GI per team. Consider need for Pain Regime.   - Monitor Skin, Wt.  - RD to remain available for additional nutrition interventions as needed.     Education:   - Encouraged PO intake during meals & reviewed importance. Spoke about protein foods, oral nutrition supplements.  - Pt identified on food insecurity screen. RD provided education on food resources in community, handout provided. Provided pt with [employer] application. All pt questions answered. Will continue to follow per RD protocol.   - Provided RD name/contact info.     Risk Level: High [ X  ] Moderate [   ] Low [   ]

## 2023-08-17 NOTE — PROGRESS NOTE ADULT - SUBJECTIVE AND OBJECTIVE BOX
**INCOMPLETE NOTE    OVERNIGHT EVENTS:    SUBJECTIVE:  Patient seen and examined at bedside.    Vital Signs Last 12 Hrs  T(F): 98.4 (08-16-23 @ 20:15), Max: 98.4 (08-16-23 @ 20:15)  HR: 64 (08-17-23 @ 00:23) (64 - 70)  BP: 123/79 (08-17-23 @ 00:23) (123/79 - 130/82)  BP(mean): 95 (08-17-23 @ 00:23) (95 - 100)  RR: 18 (08-17-23 @ 00:23) (18 - 18)  SpO2: 94% (08-17-23 @ 00:23) (94% - 97%)  I&O's Summary    16 Aug 2023 07:01  -  17 Aug 2023 07:00  --------------------------------------------------------  IN: 600 mL / OUT: 1500 mL / NET: -900 mL        PHYSICAL EXAM:  Constitutional: NAD, comfortable in bed.  HEENT: NC/AT, PERRLA, EOMI, no conjunctival pallor or scleral icterus, MMM  Neck: Supple, no JVD  Respiratory: CTA B/L. No w/r/r.   Cardiovascular: RRR, normal S1 and S2, no m/r/g.   Gastrointestinal: +BS, soft NTND, no guarding or rebound tenderness, no palpable masses   Extremities: wwp; no cyanosis, clubbing or edema.   Vascular: Pulses equal and strong throughout.   Neurological: AAOx3, no CN deficits, strength and sensation intact throughout.   Skin: No gross skin abnormalities or rashes        LABS:                        14.6   6.57  )-----------( 284      ( 16 Aug 2023 05:30 )             45.9     08-16    141  |  97  |  11  ----------------------------<  120<H>  4.0   |  35<H>  |  0.94    Ca    9.6      16 Aug 2023 05:30  Phos  3.6     08-16  Mg     1.8     08-16    TPro  8.0  /  Alb  3.6  /  TBili  0.5  /  DBili  x   /  AST  24  /  ALT  13  /  AlkPhos  66  08-16      Urinalysis Basic - ( 16 Aug 2023 05:30 )    Color: x / Appearance: x / SG: x / pH: x  Gluc: 120 mg/dL / Ketone: x  / Bili: x / Urobili: x   Blood: x / Protein: x / Nitrite: x   Leuk Esterase: x / RBC: x / WBC x   Sq Epi: x / Non Sq Epi: x / Bacteria: x          RADIOLOGY & ADDITIONAL TESTS:    MEDICATIONS  (STANDING):  atorvastatin 10 milliGRAM(s) Oral at bedtime  budesonide 160 MICROgram(s)/formoterol 4.5 MICROgram(s) Inhaler 2 Puff(s) Inhalation two times a day  dolutegravir 50 milliGRAM(s) Oral daily  emtricitabine 200 mG/tenofovir alafenamide 25 mG (DESCOVY) Tablet 1 Tablet(s) Oral daily  furosemide   Injectable 40 milliGRAM(s) IV Push every 12 hours  methadone    Tablet 85 milliGRAM(s) Oral daily  polyethylene glycol 3350 17 Gram(s) Oral two times a day  rivaroxaban 20 milliGRAM(s) Oral with dinner  senna 2 Tablet(s) Oral at bedtime  spironolactone 25 milliGRAM(s) Oral daily  tamsulosin 0.8 milliGRAM(s) Oral at bedtime  tiotropium 2.5 MICROgram(s) Inhaler 2 Puff(s) Inhalation daily    MEDICATIONS  (PRN):  albuterol    90 MICROgram(s) HFA Inhaler 2 Puff(s) Inhalation every 6 hours PRN Shortness of Breath and/or Wheezing   OVERNIGHT EVENTS: none report    SUBJECTIVE:   Patient seen and examined at bedside. Found stable and in NAD. Endorses improved SOB on NC 1L and continued urination without difficulties. Denies chest pain.    Vital Signs Last 12 Hrs  T(F): 98.4 (08-16-23 @ 20:15), Max: 98.4 (08-16-23 @ 20:15)  HR: 64 (08-17-23 @ 00:23) (64 - 70)  BP: 123/79 (08-17-23 @ 00:23) (123/79 - 130/82)  BP(mean): 95 (08-17-23 @ 00:23) (95 - 100)  RR: 18 (08-17-23 @ 00:23) (18 - 18)  SpO2: 94% (08-17-23 @ 00:23) (94% - 97%)  I&O's Summary    16 Aug 2023 07:01  -  17 Aug 2023 07:00  --------------------------------------------------------  IN: 600 mL / OUT: 1500 mL / NET: -900 mL        PHYSICAL EXAM:  Constitutional: NAD, comfortable in bed.  HEENT: NC/AT, PERRLA, EOMI, no conjunctival pallor or scleral icterus, MMM  Neck: Supple, no JVD  Respiratory: CTA B/L. No w/r/r.   Cardiovascular: RRR, normal S1 and S2, no m/r/g.   Gastrointestinal: +BS, soft NTND, no guarding or rebound tenderness, no palpable masses   Extremities: wwp; no cyanosis, clubbing or edema.   Vascular: Pulses equal and strong throughout.   Neurological: AAOx3, no CN deficits, strength and sensation intact throughout.   Skin: No gross skin abnormalities or rashes        LABS:                        14.6   6.57  )-----------( 284      ( 16 Aug 2023 05:30 )             45.9     08-16    141  |  97  |  11  ----------------------------<  120<H>  4.0   |  35<H>  |  0.94    Ca    9.6      16 Aug 2023 05:30  Phos  3.6     08-16  Mg     1.8     08-16    TPro  8.0  /  Alb  3.6  /  TBili  0.5  /  DBili  x   /  AST  24  /  ALT  13  /  AlkPhos  66  08-16      Urinalysis Basic - ( 16 Aug 2023 05:30 )    Color: x / Appearance: x / SG: x / pH: x  Gluc: 120 mg/dL / Ketone: x  / Bili: x / Urobili: x   Blood: x / Protein: x / Nitrite: x   Leuk Esterase: x / RBC: x / WBC x   Sq Epi: x / Non Sq Epi: x / Bacteria: x          RADIOLOGY & ADDITIONAL TESTS:    MEDICATIONS  (STANDING):  atorvastatin 10 milliGRAM(s) Oral at bedtime  budesonide 160 MICROgram(s)/formoterol 4.5 MICROgram(s) Inhaler 2 Puff(s) Inhalation two times a day  dolutegravir 50 milliGRAM(s) Oral daily  emtricitabine 200 mG/tenofovir alafenamide 25 mG (DESCOVY) Tablet 1 Tablet(s) Oral daily  furosemide   Injectable 40 milliGRAM(s) IV Push every 12 hours  methadone    Tablet 85 milliGRAM(s) Oral daily  polyethylene glycol 3350 17 Gram(s) Oral two times a day  rivaroxaban 20 milliGRAM(s) Oral with dinner  senna 2 Tablet(s) Oral at bedtime  spironolactone 25 milliGRAM(s) Oral daily  tamsulosin 0.8 milliGRAM(s) Oral at bedtime  tiotropium 2.5 MICROgram(s) Inhaler 2 Puff(s) Inhalation daily    MEDICATIONS  (PRN):  albuterol    90 MICROgram(s) HFA Inhaler 2 Puff(s) Inhalation every 6 hours PRN Shortness of Breath and/or Wheezing   OVERNIGHT EVENTS: none report    SUBJECTIVE:  Patient seen and examined at bedside. Found stable and in NAD. Endorses improved SOB on NC 1L and continued urination without difficulties. Denies chest pain.    Vital Signs Last 12 Hrs  T(F): 98.4 (08-16-23 @ 20:15), Max: 98.4 (08-16-23 @ 20:15)  HR: 64 (08-17-23 @ 00:23) (64 - 70)  BP: 123/79 (08-17-23 @ 00:23) (123/79 - 130/82)  BP(mean): 95 (08-17-23 @ 00:23) (95 - 100)  RR: 18 (08-17-23 @ 00:23) (18 - 18)  SpO2: 94% (08-17-23 @ 00:23) (94% - 97%)  I&O's Summary    16 Aug 2023 07:01  -  17 Aug 2023 07:00  --------------------------------------------------------  IN: 600 mL / OUT: 1500 mL / NET: -900 mL        PHYSICAL EXAM:  Constitutional: NAD, comfortable in bed.  HEENT: NC/AT, PERRLA, EOMI, no conjunctival pallor or scleral icterus, MMM  Neck: Supple, no JVD  Respiratory: CTA B/L. No w/r/r.   Cardiovascular: RRR, normal S1 and S2, no m/r/g.   Gastrointestinal: +BS, soft NTND, no guarding or rebound tenderness, no palpable masses   Extremities: wwp; no cyanosis, clubbing or edema.   Vascular: Pulses equal and strong throughout.   Neurological: AAOx3, no CN deficits, strength and sensation intact throughout.   Skin: No gross skin abnormalities or rashes        LABS:                        14.6   6.57  )-----------( 284      ( 16 Aug 2023 05:30 )             45.9     08-16    141  |  97  |  11  ----------------------------<  120<H>  4.0   |  35<H>  |  0.94    Ca    9.6      16 Aug 2023 05:30  Phos  3.6     08-16  Mg     1.8     08-16    TPro  8.0  /  Alb  3.6  /  TBili  0.5  /  DBili  x   /  AST  24  /  ALT  13  /  AlkPhos  66  08-16      Urinalysis Basic - ( 16 Aug 2023 05:30 )    Color: x / Appearance: x / SG: x / pH: x  Gluc: 120 mg/dL / Ketone: x  / Bili: x / Urobili: x   Blood: x / Protein: x / Nitrite: x   Leuk Esterase: x / RBC: x / WBC x   Sq Epi: x / Non Sq Epi: x / Bacteria: x          RADIOLOGY & ADDITIONAL TESTS:    MEDICATIONS  (STANDING):  atorvastatin 10 milliGRAM(s) Oral at bedtime  budesonide 160 MICROgram(s)/formoterol 4.5 MICROgram(s) Inhaler 2 Puff(s) Inhalation two times a day  dolutegravir 50 milliGRAM(s) Oral daily  emtricitabine 200 mG/tenofovir alafenamide 25 mG (DESCOVY) Tablet 1 Tablet(s) Oral daily  furosemide   Injectable 40 milliGRAM(s) IV Push every 12 hours  methadone    Tablet 85 milliGRAM(s) Oral daily  polyethylene glycol 3350 17 Gram(s) Oral two times a day  rivaroxaban 20 milliGRAM(s) Oral with dinner  senna 2 Tablet(s) Oral at bedtime  spironolactone 25 milliGRAM(s) Oral daily  tamsulosin 0.8 milliGRAM(s) Oral at bedtime  tiotropium 2.5 MICROgram(s) Inhaler 2 Puff(s) Inhalation daily    MEDICATIONS  (PRN):  albuterol    90 MICROgram(s) HFA Inhaler 2 Puff(s) Inhalation every 6 hours PRN Shortness of Breath and/or Wheezing

## 2023-08-17 NOTE — PROGRESS NOTE ADULT - REASON FOR ADMISSION
MD Fede at pt recliner side.    acute on chronic HFpEF exacerbation in setting of medication noncompliance and suspected CAP.

## 2023-08-18 ENCOUNTER — TRANSCRIPTION ENCOUNTER (OUTPATIENT)
Age: 63
End: 2023-08-18

## 2023-08-18 DIAGNOSIS — N17.9 ACUTE KIDNEY FAILURE, UNSPECIFIED: ICD-10-CM

## 2023-08-18 PROBLEM — J44.1 CHRONIC OBSTRUCTIVE PULMONARY DISEASE WITH (ACUTE) EXACERBATION: Chronic | Status: ACTIVE | Noted: 2023-08-14

## 2023-08-18 PROBLEM — I26.99 OTHER PULMONARY EMBOLISM WITHOUT ACUTE COR PULMONALE: Chronic | Status: ACTIVE | Noted: 2023-08-14

## 2023-08-18 PROBLEM — I50.32 CHRONIC DIASTOLIC (CONGESTIVE) HEART FAILURE: Chronic | Status: ACTIVE | Noted: 2023-08-14

## 2023-08-18 PROBLEM — Z00.00 ENCOUNTER FOR PREVENTIVE HEALTH EXAMINATION: Status: ACTIVE | Noted: 2023-08-18

## 2023-08-18 PROBLEM — B20 HUMAN IMMUNODEFICIENCY VIRUS [HIV] DISEASE: Chronic | Status: ACTIVE | Noted: 2023-08-14

## 2023-08-18 PROBLEM — I10 ESSENTIAL (PRIMARY) HYPERTENSION: Chronic | Status: ACTIVE | Noted: 2023-08-14

## 2023-08-18 PROBLEM — I82.409 ACUTE EMBOLISM AND THROMBOSIS OF UNSPECIFIED DEEP VEINS OF UNSPECIFIED LOWER EXTREMITY: Chronic | Status: ACTIVE | Noted: 2023-08-14

## 2023-08-18 PROBLEM — E78.5 HYPERLIPIDEMIA, UNSPECIFIED: Chronic | Status: ACTIVE | Noted: 2023-08-14

## 2023-08-18 LAB
ALBUMIN SERPL ELPH-MCNC: 3.5 G/DL — SIGNIFICANT CHANGE UP (ref 3.3–5)
ALP SERPL-CCNC: 69 U/L — SIGNIFICANT CHANGE UP (ref 40–120)
ALT FLD-CCNC: 14 U/L — SIGNIFICANT CHANGE UP (ref 10–45)
ANION GAP SERPL CALC-SCNC: 7 MMOL/L — SIGNIFICANT CHANGE UP (ref 5–17)
ANION GAP SERPL CALC-SCNC: 7 MMOL/L — SIGNIFICANT CHANGE UP (ref 5–17)
AST SERPL-CCNC: 25 U/L — SIGNIFICANT CHANGE UP (ref 10–40)
BASOPHILS # BLD AUTO: 0.03 K/UL — SIGNIFICANT CHANGE UP (ref 0–0.2)
BASOPHILS NFR BLD AUTO: 0.3 % — SIGNIFICANT CHANGE UP (ref 0–2)
BILIRUB SERPL-MCNC: 0.4 MG/DL — SIGNIFICANT CHANGE UP (ref 0.2–1.2)
BUN SERPL-MCNC: 25 MG/DL — HIGH (ref 7–23)
BUN SERPL-MCNC: 30 MG/DL — HIGH (ref 7–23)
CALCIUM SERPL-MCNC: 9.4 MG/DL — SIGNIFICANT CHANGE UP (ref 8.4–10.5)
CALCIUM SERPL-MCNC: 9.9 MG/DL — SIGNIFICANT CHANGE UP (ref 8.4–10.5)
CHLORIDE SERPL-SCNC: 95 MMOL/L — LOW (ref 96–108)
CHLORIDE SERPL-SCNC: 96 MMOL/L — SIGNIFICANT CHANGE UP (ref 96–108)
CO2 SERPL-SCNC: 34 MMOL/L — HIGH (ref 22–31)
CO2 SERPL-SCNC: 36 MMOL/L — HIGH (ref 22–31)
CREAT ?TM UR-MCNC: 221 MG/DL — SIGNIFICANT CHANGE UP
CREAT SERPL-MCNC: 1.43 MG/DL — HIGH (ref 0.5–1.3)
CREAT SERPL-MCNC: 1.88 MG/DL — HIGH (ref 0.5–1.3)
EGFR: 39 ML/MIN/1.73M2 — LOW
EGFR: 54 ML/MIN/1.73M2 — LOW
EOSINOPHIL # BLD AUTO: 0.24 K/UL — SIGNIFICANT CHANGE UP (ref 0–0.5)
EOSINOPHIL NFR BLD AUTO: 2.8 % — SIGNIFICANT CHANGE UP (ref 0–6)
GLUCOSE SERPL-MCNC: 106 MG/DL — HIGH (ref 70–99)
GLUCOSE SERPL-MCNC: 106 MG/DL — HIGH (ref 70–99)
HCT VFR BLD CALC: 46.8 % — SIGNIFICANT CHANGE UP (ref 39–50)
HGB BLD-MCNC: 15.3 G/DL — SIGNIFICANT CHANGE UP (ref 13–17)
IMM GRANULOCYTES NFR BLD AUTO: 0.3 % — SIGNIFICANT CHANGE UP (ref 0–0.9)
LYMPHOCYTES # BLD AUTO: 3.68 K/UL — HIGH (ref 1–3.3)
LYMPHOCYTES # BLD AUTO: 42.3 % — SIGNIFICANT CHANGE UP (ref 13–44)
MAGNESIUM SERPL-MCNC: 1.9 MG/DL — SIGNIFICANT CHANGE UP (ref 1.6–2.6)
MCHC RBC-ENTMCNC: 32.3 PG — SIGNIFICANT CHANGE UP (ref 27–34)
MCHC RBC-ENTMCNC: 32.7 GM/DL — SIGNIFICANT CHANGE UP (ref 32–36)
MCV RBC AUTO: 98.7 FL — SIGNIFICANT CHANGE UP (ref 80–100)
MONOCYTES # BLD AUTO: 1.05 K/UL — HIGH (ref 0–0.9)
MONOCYTES NFR BLD AUTO: 12.1 % — SIGNIFICANT CHANGE UP (ref 2–14)
NEUTROPHILS # BLD AUTO: 3.67 K/UL — SIGNIFICANT CHANGE UP (ref 1.8–7.4)
NEUTROPHILS NFR BLD AUTO: 42.2 % — LOW (ref 43–77)
NRBC # BLD: 0 /100 WBCS — SIGNIFICANT CHANGE UP (ref 0–0)
OSMOLALITY UR: 456 MOSM/KG — SIGNIFICANT CHANGE UP (ref 300–900)
PHOSPHATE SERPL-MCNC: 5.1 MG/DL — HIGH (ref 2.5–4.5)
PLATELET # BLD AUTO: 284 K/UL — SIGNIFICANT CHANGE UP (ref 150–400)
POTASSIUM SERPL-MCNC: 4.2 MMOL/L — SIGNIFICANT CHANGE UP (ref 3.5–5.3)
POTASSIUM SERPL-MCNC: 4.6 MMOL/L — SIGNIFICANT CHANGE UP (ref 3.5–5.3)
POTASSIUM SERPL-SCNC: 4.2 MMOL/L — SIGNIFICANT CHANGE UP (ref 3.5–5.3)
POTASSIUM SERPL-SCNC: 4.6 MMOL/L — SIGNIFICANT CHANGE UP (ref 3.5–5.3)
POTASSIUM UR-SCNC: 53 MMOL/L — SIGNIFICANT CHANGE UP
PROT ?TM UR-MCNC: 21 MG/DL — HIGH (ref 0–12)
PROT SERPL-MCNC: 8.3 G/DL — SIGNIFICANT CHANGE UP (ref 6–8.3)
PROT/CREAT UR-RTO: 0.1 RATIO — SIGNIFICANT CHANGE UP (ref 0–0.2)
RBC # BLD: 4.74 M/UL — SIGNIFICANT CHANGE UP (ref 4.2–5.8)
RBC # FLD: 14.6 % — HIGH (ref 10.3–14.5)
SODIUM SERPL-SCNC: 137 MMOL/L — SIGNIFICANT CHANGE UP (ref 135–145)
SODIUM SERPL-SCNC: 138 MMOL/L — SIGNIFICANT CHANGE UP (ref 135–145)
SODIUM UR-SCNC: 57 MMOL/L — SIGNIFICANT CHANGE UP
UUN UR-MCNC: 562 MG/DL — SIGNIFICANT CHANGE UP
WBC # BLD: 8.7 K/UL — SIGNIFICANT CHANGE UP (ref 3.8–10.5)
WBC # FLD AUTO: 8.7 K/UL — SIGNIFICANT CHANGE UP (ref 3.8–10.5)

## 2023-08-18 PROCEDURE — 99233 SBSQ HOSP IP/OBS HIGH 50: CPT

## 2023-08-18 RX ORDER — SODIUM CHLORIDE 9 MG/ML
500 INJECTION INTRAMUSCULAR; INTRAVENOUS; SUBCUTANEOUS
Refills: 0 | Status: DISCONTINUED | OUTPATIENT
Start: 2023-08-18 | End: 2023-08-19

## 2023-08-18 RX ADMIN — SODIUM CHLORIDE 100 MILLILITER(S): 9 INJECTION INTRAMUSCULAR; INTRAVENOUS; SUBCUTANEOUS at 13:20

## 2023-08-18 RX ADMIN — ATORVASTATIN CALCIUM 10 MILLIGRAM(S): 80 TABLET, FILM COATED ORAL at 21:50

## 2023-08-18 RX ADMIN — BUDESONIDE AND FORMOTEROL FUMARATE DIHYDRATE 2 PUFF(S): 160; 4.5 AEROSOL RESPIRATORY (INHALATION) at 17:48

## 2023-08-18 RX ADMIN — TIOTROPIUM BROMIDE 2 PUFF(S): 18 CAPSULE ORAL; RESPIRATORY (INHALATION) at 13:22

## 2023-08-18 RX ADMIN — SENNA PLUS 2 TABLET(S): 8.6 TABLET ORAL at 21:50

## 2023-08-18 RX ADMIN — Medication 40 MILLIGRAM(S): at 05:58

## 2023-08-18 RX ADMIN — RIVAROXABAN 20 MILLIGRAM(S): KIT at 17:16

## 2023-08-18 RX ADMIN — TAMSULOSIN HYDROCHLORIDE 0.8 MILLIGRAM(S): 0.4 CAPSULE ORAL at 21:50

## 2023-08-18 RX ADMIN — EMTRICITABINE AND TENOFOVIR DISOPROXIL FUMARATE 1 TABLET(S): 200; 300 TABLET, FILM COATED ORAL at 13:21

## 2023-08-18 RX ADMIN — METHADONE HYDROCHLORIDE 85 MILLIGRAM(S): 40 TABLET ORAL at 13:22

## 2023-08-18 RX ADMIN — DOLUTEGRAVIR SODIUM 50 MILLIGRAM(S): 25 TABLET, FILM COATED ORAL at 13:23

## 2023-08-18 RX ADMIN — BUDESONIDE AND FORMOTEROL FUMARATE DIHYDRATE 2 PUFF(S): 160; 4.5 AEROSOL RESPIRATORY (INHALATION) at 05:57

## 2023-08-18 NOTE — PROGRESS NOTE ADULT - PROBLEM SELECTOR PLAN 8
continue HOME MED: Tamsulosin 0.8mg PO daily former heroine addict, quit in 1998. Currently on methadone 85mg QD PO.  - C/w methadone 85mg QD PO

## 2023-08-18 NOTE — PROGRESS NOTE ADULT - SUBJECTIVE AND OBJECTIVE BOX
**INCOMPLETE NOTE    OVERNIGHT EVENTS:    SUBJECTIVE:  Patient seen and examined at bedside.    Vital Signs Last 12 Hrs  T(F): 98.1 (08-18-23 @ 05:51), Max: 98.1 (08-17-23 @ 20:36)  HR: 66 (08-18-23 @ 05:51) (66 - 74)  BP: 99/66 (08-18-23 @ 05:51) (99/66 - 103/60)  BP(mean): 77 (08-18-23 @ 05:51) (77 - 78)  RR: 18 (08-18-23 @ 05:51) (18 - 18)  SpO2: 96% (08-18-23 @ 05:51) (96% - 99%)  I&O's Summary    17 Aug 2023 07:01  -  18 Aug 2023 07:00  --------------------------------------------------------  IN: 180 mL / OUT: 0 mL / NET: 180 mL        PHYSICAL EXAM:  Constitutional: NAD, comfortable in bed.  HEENT: NC/AT, PERRLA, EOMI, no conjunctival pallor or scleral icterus, MMM  Neck: Supple, no JVD  Respiratory: CTA B/L. No w/r/r.   Cardiovascular: RRR, normal S1 and S2, no m/r/g.   Gastrointestinal: +BS, soft NTND, no guarding or rebound tenderness, no palpable masses   Extremities: wwp; no cyanosis, clubbing or edema.   Vascular: Pulses equal and strong throughout.   Neurological: AAOx3, no CN deficits, strength and sensation intact throughout.   Skin: No gross skin abnormalities or rashes        LABS:                        15.3   8.70  )-----------( 284      ( 18 Aug 2023 05:30 )             46.8     08-18    137  |  96  |  x   ----------------------------<  106<H>  4.6   |  34<H>  |  x     Ca    9.9      18 Aug 2023 05:30  Phos  5.1     08-18  Mg     1.9     08-18    TPro  8.3  /  Alb  3.5  /  TBili  0.4  /  DBili  x   /  AST  25  /  ALT  14  /  AlkPhos  69  08-18      Urinalysis Basic - ( 18 Aug 2023 05:30 )    Color: x / Appearance: x / SG: x / pH: x  Gluc: 106 mg/dL / Ketone: x  / Bili: x / Urobili: x   Blood: x / Protein: x / Nitrite: x   Leuk Esterase: x / RBC: x / WBC x   Sq Epi: x / Non Sq Epi: x / Bacteria: x          RADIOLOGY & ADDITIONAL TESTS:    MEDICATIONS  (STANDING):  atorvastatin 10 milliGRAM(s) Oral at bedtime  budesonide 160 MICROgram(s)/formoterol 4.5 MICROgram(s) Inhaler 2 Puff(s) Inhalation two times a day  dolutegravir 50 milliGRAM(s) Oral daily  emtricitabine 200 mG/tenofovir alafenamide 25 mG (DESCOVY) Tablet 1 Tablet(s) Oral daily  furosemide   Injectable 40 milliGRAM(s) IV Push every 12 hours  methadone    Tablet 85 milliGRAM(s) Oral daily  polyethylene glycol 3350 17 Gram(s) Oral two times a day  rivaroxaban 20 milliGRAM(s) Oral with dinner  senna 2 Tablet(s) Oral at bedtime  spironolactone 25 milliGRAM(s) Oral daily  tamsulosin 0.8 milliGRAM(s) Oral at bedtime  tiotropium 2.5 MICROgram(s) Inhaler 2 Puff(s) Inhalation daily    MEDICATIONS  (PRN):  albuterol    90 MICROgram(s) HFA Inhaler 2 Puff(s) Inhalation every 6 hours PRN Shortness of Breath and/or Wheezing   OVERNIGHT EVENTS: none to report    SUBJECTIVE:  Patient seen and examined at bedside. Found stable and in NAD. Endorses improved SOB on NC 1L and continued urination without difficulties. Denies chest pain, SOB at rest, flank pain, difficulty emptying void or residual urinary urgency after voiding, or gross blood in urine.    Vital Signs Last 12 Hrs  T(F): 98.1 (08-18-23 @ 05:51), Max: 98.1 (08-17-23 @ 20:36)  HR: 66 (08-18-23 @ 05:51) (66 - 74)  BP: 99/66 (08-18-23 @ 05:51) (99/66 - 103/60)  BP(mean): 77 (08-18-23 @ 05:51) (77 - 78)  RR: 18 (08-18-23 @ 05:51) (18 - 18)  SpO2: 96% (08-18-23 @ 05:51) (96% - 99%)  I&O's Summary    17 Aug 2023 07:01  -  18 Aug 2023 07:00  --------------------------------------------------------  IN: 180 mL / OUT: 0 mL / NET: 180 mL        PHYSICAL EXAM:  Constitutional: well-groomed; no distress  Eyes: PERRL; EOMI; conjunctiva clear  Respiratory: faint b/l basilar crackles  Cardiovascular	regular rate and rhythm: S1 S2 present; no murmur; no JVD; peripheral edema; bilateral non-pitting edema.  Gastrointestinal: soft; nontender; nondistended; normal active bowel sounds  Neurological: cranial nerves II-XII intact; sensation intact  Skin: warm and dry; color normal; no rashes; no ulcers  Psychiatric: normal affect; alert and oriented x3; normal behavior        LABS:                        15.3   8.70  )-----------( 284      ( 18 Aug 2023 05:30 )             46.8     08-18    137  |  96  |  x   ----------------------------<  106<H>  4.6   |  34<H>  |  x     Ca    9.9      18 Aug 2023 05:30  Phos  5.1     08-18  Mg     1.9     08-18    TPro  8.3  /  Alb  3.5  /  TBili  0.4  /  DBili  x   /  AST  25  /  ALT  14  /  AlkPhos  69  08-18      Urinalysis Basic - ( 18 Aug 2023 05:30 )    Color: x / Appearance: x / SG: x / pH: x  Gluc: 106 mg/dL / Ketone: x  / Bili: x / Urobili: x   Blood: x / Protein: x / Nitrite: x   Leuk Esterase: x / RBC: x / WBC x   Sq Epi: x / Non Sq Epi: x / Bacteria: x          RADIOLOGY & ADDITIONAL TESTS:    MEDICATIONS  (STANDING):  atorvastatin 10 milliGRAM(s) Oral at bedtime  budesonide 160 MICROgram(s)/formoterol 4.5 MICROgram(s) Inhaler 2 Puff(s) Inhalation two times a day  dolutegravir 50 milliGRAM(s) Oral daily  emtricitabine 200 mG/tenofovir alafenamide 25 mG (DESCOVY) Tablet 1 Tablet(s) Oral daily  furosemide   Injectable 40 milliGRAM(s) IV Push every 12 hours  methadone    Tablet 85 milliGRAM(s) Oral daily  polyethylene glycol 3350 17 Gram(s) Oral two times a day  rivaroxaban 20 milliGRAM(s) Oral with dinner  senna 2 Tablet(s) Oral at bedtime  spironolactone 25 milliGRAM(s) Oral daily  tamsulosin 0.8 milliGRAM(s) Oral at bedtime  tiotropium 2.5 MICROgram(s) Inhaler 2 Puff(s) Inhalation daily    MEDICATIONS  (PRN):  albuterol    90 MICROgram(s) HFA Inhaler 2 Puff(s) Inhalation every 6 hours PRN Shortness of Breath and/or Wheezing

## 2023-08-18 NOTE — DISCHARGE NOTE PROVIDER - NSDCFUSCHEDAPPT_GEN_ALL_CORE_FT
Mitchell Levy  [facility] Physician [facility]  [facility] [address]  Scheduled Appointment: 08/29/2023

## 2023-08-18 NOTE — PROGRESS NOTE ADULT - ATTENDING COMMENTS
I have made amendments to the documentation where necessary, and agree with the history, physical exam, and plan as documented by the Resident. I saw, evaluated, and examined the patient at the bedside. I discussed the patient’s case with the resident and agree with resident’s note, ROS findings, physical exam, assessment, and plan as documented in the resident’s note, with the following addendum.    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 Hospital course c/b CHEIKH likely pre-renal.     Assessment:  1) Acute on chronic decompensated HFpEF exacerbation - compensated on exam  2) Unprovoked DVT and PE on lifelong AC w/ xarelto  3) HIV on HAART  4) Tobacco use disorder  5) COPD  6) CHEIKH    Plan  1) Discontinue lasix and aldactone, will resume once CHEIKH improves  2) IVF with NS 500cc x 5 hours  3) C/w AC with xarelto at home dose   4) HAART at home dose  5) Avoid nephrotoxic agents  6) Anticipate discharge tomorrow once CHEIKH improves with IVF     Total of 35 minutes were spent in reviewing the patient’s chart, examining the patient, and discussing patient’s assessment and plan.     Maxx Slade M.D.  CARDIOLOGY ATTENDING. I have made amendments to the documentation where necessary, and agree with the history, physical exam, and plan as documented by the Resident. I saw, evaluated, and examined the patient at the bedside. I discussed the patient’s case with the resident and agree with resident’s note, ROS findings, physical exam, assessment, and plan as documented in the resident’s note, with the following addendum.    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 Hospital course c/b CHEIKH likely pre-renal.     Assessment:  1) Acute on chronic decompensated HFpEF exacerbation - compensated on exam  2) Unprovoked DVT and PE on lifelong AC w/ xarelto  3) HIV on HAART  4) Tobacco use disorder  5) COPD  6) CHEIKH    Plan  1) Discontinue lasix and aldactone, will resume once CHEIKH improves  2) IVF with NS 500cc x 5 hours  3) C/w AC with xarelto at home dose   4) HAART at home dose  5) Avoid nephrotoxic agents  6) Anticipate discharge tomorrow once CHEIKH improves with IVF     Total of 50 minutes were spent in reviewing the patient’s chart, examining the patient, and discussing patient’s assessment and plan.     Maxx Slade M.D.  CARDIOLOGY ATTENDING.

## 2023-08-18 NOTE — PROGRESS NOTE ADULT - PROBLEM SELECTOR PLAN 9
N: DASH with 1 liter fluid restriction. Per nutrition 2 ensures enlive QD  E: Maintain K>4.0 and Mg >2.0  VTE PPx: on Xarelto  Code: Full continue HOME MED: Tamsulosin 0.8mg PO daily

## 2023-08-18 NOTE — DISCHARGE NOTE PROVIDER - PROVIDER TOKENS
PROVIDER:[TOKEN:[[id number]],SCHEDULEDAPPT:[08/29/2023],SCHEDULEDAPPTTIME:[04:15 PM],ESTABLISHEDPATIENT:[T]]

## 2023-08-18 NOTE — PROGRESS NOTE ADULT - PROBLEM SELECTOR PLAN 1
Patient currently stable and asymptomatic on NC 1L satting >95%. Improved SOB and reduced but persistent inspiratory crackles. Will benefit from continued diuresis with Lasix 40mg BID IVP. Daily weights and strict I/Os monitoring. Fluid restriction to 1L daily. Maintain net even balance -1L to -2L       - C/w Lasix 40mg BID IVP. Goal net even fluid balance of -1 to -1.5L  - C/w Aldactone 25mg QD PO  - Strict I/Os and daily weights.      **Prior TTE 3/24/23: LVEF 60-65%, small focal area of hypokinesis in apical inferolateral wall. Dilated RV, septal flattening, PFO by color. MV leaflets are echodense and bileaflet prolapse.    **Recent [email or web address] 6/30/23: calcium score 0, mild annular calcification, mLAD intramyocardial bridge, grossly patent m/dLAD, pLCx, OM1, pRCA. Patient currently stable and asymptomatic on RA. No SOB at rest but desats on exertion probably as consequence of COPD. Fine inspiratory crackles present in bilateral bases. Will benefit from continued diuresis with Lasix 40mg BID IVP. Daily weights and strict I/Os monitoring. Fluid restriction to 1L daily. Maintain net even balance -1L to -2L       - C/w Lasix 40mg BID IVP. Goal net even fluid balance of -1 to -1.5L  - C/w Aldactone 25mg QD PO  - Strict I/Os and daily weights.      **Prior TTE 3/24/23: LVEF 60-65%, small focal area of hypokinesis in apical inferolateral wall. Dilated RV, septal flattening, PFO by color. MV leaflets are echodense and bileaflet prolapse.    **Recent CCTA@[facility] 6/30/23: calcium score 0, mild annular calcification, mLAD intramyocardial bridge, grossly patent m/dLAD, pLCx, OM1, pRCA. Patient currently stable and asymptomatic on RA. No SOB at rest but desats on exertion probably as consequence of COPD. Fine inspiratory crackles present in bilateral bases. Will benefit from continued diuresis with Lasix 40mg BID IVP. Daily weights and strict I/Os monitoring. Fluid restriction to 1L daily. Maintain net balance -0.5L to -1L       - Switch to Lasix 40mg QD PO. Goal net fluid balance of -0.5L to -1L.  - C/w Aldactone 25mg QD PO  - Fluid restriction to 1L daily   - Strict I/Os and daily weights      **Prior TTE 3/24/23: LVEF 60-65%, small focal area of hypokinesis in apical inferolateral wall. Dilated RV, septal flattening, PFO by color. MV leaflets are echodense and bileaflet prolapse.    **Recent CCTA@[facility] 6/30/23: calcium score 0, mild annular calcification, mLAD intramyocardial bridge, grossly patent m/dLAD, pLCx, OM1, pRCA. Patient currently stable and asymptomatic on RA. No SOB at rest but desats on exertion probably as consequence of COPD. Fine inspiratory crackles present in bilateral bases. Will benefit from continued diuresis with Lasix 40mg BID IVP. Daily weights and strict I/Os monitoring. Fluid restriction to 1L daily. Maintain net balance -0.5L to -1L       - Hold Lasix 40mg BID IVP. Lasix 40mg QD PO.  - Goal net fluid balance of -0.5L to -1L.  - D/C Aldactone 25mg QD PO. Resume as outpatient   - Fluid restriction to 1L daily   - Strict I/Os and daily weights      **Prior TTE 3/24/23: LVEF 60-65%, small focal area of hypokinesis in apical inferolateral wall. Dilated RV, septal flattening, PFO by color. MV leaflets are echodense and bileaflet prolapse.    **Recent CCTA@[facility] 6/30/23: calcium score 0, mild annular calcification, mLAD intramyocardial bridge, grossly patent m/dLAD, pLCx, OM1, pRCA.

## 2023-08-18 NOTE — PROGRESS NOTE ADULT - PROBLEM SELECTOR PLAN 2
hx of LLE DVT and PE diagnosed in 8/2020. Recommended to be on lifelong anticoagulation per patient, unclear if he has had hypercoagulable work-up.  -- will continue anticoagulation with HOME MED: Xarelto 20mg PO daily. sCr up to 1.88 from 1.09 over 24 hours. Patient currently non-oliguric and denying any difficulty urinating or residual urinary urgency after voiding as well as no endorsement of clinical symptoms suggestive of urolithiasis. Possible pre-renal etiology given extensive diuresis but post-obstructive causes due to dramatic increase over short time cannot be ruled out yet. Bladder scan ordered to evaluate sCr up to 1.88 from 1.09 over 24 hours. Patient currently non-oliguric and denying any difficulty urinating or residual urinary urgency after voiding as well as no endorsement of clinical symptoms suggestive of urolithiasis. Labs showed increased phosphorus (5.1) but no hyperkalemia or acid-base disorders. Urinary analysis shows Yolanda 57, UOsm 456. Possible pre-renal etiology given extensive diuresis but post-obstructive causes due to dramatic increase over short time cannot be ruled out yet. Bladder scan ordered to r/o. Will start 500cc NaCl bolus and keep trending sCr sCr up to 1.88 from 1.09 over 24 hours. Patient currently non-oliguric and denying any difficulty urinating or residual urinary urgency after voiding as well as no endorsement of clinical symptoms suggestive of urolithiasis. Labs showed increased phosphorus (5.1) but no hyperkalemia or acid-base disorders. Urinary analysis shows Yolanda 57, UOsm 456. Possible pre-renal etiology given extensive diuresis but post-obstructive causes due to dramatic increase over short time cannot be ruled out yet. Bladder scan ordered to r/o. Will start 500cc 0.9% NaCl 100mL/hr and keep trending sCr.    Plan:  - Start 500cc 0.9% NaCl 100mL/hr   - Trend sCr  - Strict I/Os  - see above for fluid restriction and net fluid balance

## 2023-08-18 NOTE — PROGRESS NOTE ADULT - PROBLEM SELECTOR PLAN 5
continue HOME MEDS: Ventolin HFA 90mcg/inh PRN, Symbicort 160/4.5mcg 2 puffs inh BID and Spiriva inhaled daily. (RESOLVED)  Patient currently afebrile and with CBC wnl. D/C'd antibiotics as no longer needed.

## 2023-08-18 NOTE — DISCHARGE NOTE PROVIDER - NSDCMRMEDTOKEN_GEN_ALL_CORE_FT
atorvastatin 10 mg oral tablet: 1 orally once a day  Descovy 200 mg-25 mg oral tablet: 1 tab(s) orally once a day  furosemide 20 mg oral tablet: 1 orally once a day  losartan 50 mg oral tablet: 1 tab(s) orally once a day  Occupational Therapy ICD 10: R26.81: 3x weekly  Physical Therapy ICD10: R26.81: 3x weekly  Spiriva 18 mcg inhalation capsule: 1 cap(s) inhaled once a day  Symbicort 160 mcg-4.5 mcg/inh inhalation aerosol: 2 inhaled 2 times a day  tamsulosin 0.4 mg oral capsule: 2 cap(s) orally once a day  Tivicay 50 mg oral tablet: 1 tab(s) orally once a day  Ventolin HFA 90 mcg/inh inhalation aerosol: 2 inhaled every 6 hours  Xarelto 20 mg oral tablet: 1 orally once a day   atorvastatin 10 mg oral tablet: 1 orally once a day  Descovy 200 mg-25 mg oral tablet: 1 tab(s) orally once a day  furosemide 40 mg oral tablet: 1 tab(s) orally once a day  losartan 50 mg oral tablet: 1 tab(s) orally once a day  methadone 5 mg oral tablet: 17 tab(s) orally once a day  Occupational Therapy ICD 10: R26.81: 3x weekly  Physical Therapy ICD10: R26.81: 3x weekly  Spiriva 18 mcg inhalation capsule: 1 cap(s) inhaled once a day  Symbicort 160 mcg-4.5 mcg/inh inhalation aerosol: 2 inhaled 2 times a day  tamsulosin 0.4 mg oral capsule: 2 cap(s) orally once a day  Tivicay 50 mg oral tablet: 1 tab(s) orally once a day  Ventolin HFA 90 mcg/inh inhalation aerosol: 2 inhaled every 6 hours  Xarelto 20 mg oral tablet: 1 orally once a day

## 2023-08-18 NOTE — DISCHARGE NOTE PROVIDER - CARE PROVIDER_API CALL
Mitchell Levy  Cardiology  [address] [place] [address], [address]  [place] [address]  Phone: [phone]  Fax: [phone]  Established Patient  Scheduled Appointment: 08/29/2023 04:15 PM

## 2023-08-18 NOTE — PROGRESS NOTE ADULT - PROBLEM SELECTOR PLAN 3
Lipid panel wnl. Will continue home lipitor.    -C/w Atorvastatin 10mg PO qhs. hx of LLE DVT and PE diagnosed in 8/2020. Recommended to be on lifelong anticoagulation per patient, unclear if he has had hypercoagulable work-up.  -- will continue anticoagulation with HOME MED: Xarelto 20mg PO daily.

## 2023-08-18 NOTE — PROGRESS NOTE ADULT - PROBLEM SELECTOR PLAN 4
(RESOLVED)  Patient currently afebrile and with CBC wnl. D/C'd antibiotics as no longer needed. Lipid panel wnl. Will continue home lipitor.    -C/w Atorvastatin 10mg PO qhs.

## 2023-08-18 NOTE — PROGRESS NOTE ADULT - PROBLEM SELECTOR PLAN 6
undetectable VL per patient, will continue HOME MEDS: Descovy 200/25mg PO daily and Tivicay  50 mg PO daily. continue HOME MEDS: Ventolin HFA 90mcg/inh PRN, Symbicort 160/4.5mcg 2 puffs inh BID and Spiriva inhaled daily.

## 2023-08-18 NOTE — PROGRESS NOTE ADULT - PROBLEM SELECTOR PLAN 7
former heroine addict, quit in 1998. Currently on methadone 85mg QD PO.  - C/w methadone 85mg QD PO undetectable VL per patient, will continue HOME MEDS: Descovy 200/25mg PO daily and Tivicay  50 mg PO daily.

## 2023-08-18 NOTE — PROGRESS NOTE ADULT - ASSESSMENT
63 yr old M chronic smoker, former heroine addict,  PMHx of HIV (on HAART since 2008, VL undetectable), COPD (not on home O2), hx of LLE DVT, PE (diagnosed 8/2020, on Xarelto), hyperlipidemia, chronic HFpEF (EF:65%), nonobstructive coronaries (by CCTA 6/30/23), BPH who presented to [facility] 8/14/23 c/o progressively worsening dyspnea on minimal exertion x few days,  transferred to [facility] cardiac telemetry for management of acute on chronic HFpEF exacerbation in setting of medication noncompliance and suspected CAP.
63 yr old M chronic smoker, former heroine addict,  PMHx of HIV (on HAART since 2008, VL undetectable), COPD (not on home O2), hx of LLE DVT, PE (diagnosed 8/2020, on Xarelto), hyperlipidemia, chronic HFpEF (EF:65%), nonobstructive coronaries (by CCTA 6/30/23), BPH who presented to [facility] 8/14/23 c/o progressively worsening dyspnea on minimal exertion x few days,  transferred to [facility] cardiac telemetry for management of acute on chronic HFpEF exacerbation in setting of medication noncompliance and suspected CAP.
63 yr old M chronic smoker, former heroine addict,  PMHx of HIV (on HAART since 2008, VL undetectable), COPD (not on home O2), hx of LLE DVT, PE (diagnosed 8/2020, on Xarelto), hyperlipidemia, chronic HFpEF (EF:65%), nonobstructive coronaries (by CCTA 6/30/23), BPH who presented to [facility] 8/14/23 c/o progressively worsening dyspnea on minimal exertion x few days,  transferred to [facility] cardiac telemetry for management of acute on chronic HFpEF exacerbation in setting of medication noncompliance and suspected CAP.
63 yr old M chronic smoker, former heroine addict,  PMHx of HIV (on HAART since 2008, VL undetectable), COPD (not on home O2), hx of LLE DVT, PE (diagnosed 8/2020, on Xarelto), hyperlipidemia, chronic HFpEF (EF:65%), nonobstructive coronaries (by CCTA 6/30/23), BPH who presented to [facility] 8/14/23 c/o progressively worsening dyspnea on minimal exertion x few days,  transferred to [facility] cardiac telemetry for management of acute on chronic HFpEF exacerbation in setting of medication noncompliance and suspected CAP.
Fetal Non-Stress Test (NST)/Follow up Sonogram for Growth/1st Trimester Sonogram/Fetal Sonogram/BioPhysical Profile(s)

## 2023-08-18 NOTE — DISCHARGE NOTE PROVIDER - HOSPITAL COURSE
#Discharge: do not delete    PATIENT NAME is a ___y/o female/male with a past medical history of _____    Presented with _____, found to have _____    Problem List/Main Diagnoses (system-based):  #    #    #    Patient was discharged to: (home/VIRAL/acute rehab/hospice, etc. and w/ home health/home PT/RN/home O2)    New medications:  Changes to old medications:  Medications that were stopped:    Items to follow up as outpatient:    Physical exam at the time of discharge: #Discharge: do not delete    PATIENT NAME is a ___y/o female/male with a past medical history of _____    Presented with _____, found to have _____    Problem List/Main Diagnoses (system-based):  #    #    #Congestive heart failure   - patient requires a rollator or rolling walker to ambulate outside the hospital     Patient was discharged to: (home/VIRAL/acute rehab/hospice, etc. and w/ home health/home PT/RN/home O2)    New medications:  Changes to old medications:  Medications that were stopped:    Items to follow up as outpatient:    Physical exam at the time of discharge: #Discharge: do not delete  63 yr old M chronic smoker, former heroine addict,  PMHx of HIV (on HAART since 2008, VL undetectable), COPD (not on home O2), hx of LLE DVT, PE (diagnosed 8/2020, on Xarelto), hyperlipidemia, chronic HFpEF (EF:65%), nonobstructive coronaries (by CCTA 6/30/23), BPH who presented to [facility] 8/14/23 c/o progressively worsening dyspnea on minimal exertion x few days,  transferred to [facility] cardiac telemetry for management of acute on chronic HFpEF exacerbation in setting of medication noncompliance and suspected CAP.    Problem List/Main Diagnoses (system-based):   Problem/Plan - 1:  ·  Problem: Acute on chronic heart failure with preserved ejection fraction (HFpEF).   ·  Plan: Patient currently stable and asymptomatic on RA. No SOB at rest but desats on exertion probably as consequence of COPD. Fine inspiratory crackles present in bilateral bases. Will benefit from continued diuresis with Lasix 40mg BID IVP. Daily weights and strict I/Os monitoring. Fluid restriction to 1L daily. Maintain net balance -0.5L to -1L       - Hold Lasix 40mg BID IVP. Lasix 40mg QD PO.  - Goal net fluid balance of -0.5L to -1L.  - D/C Aldactone 25mg QD PO. Resume as outpatient   - Fluid restriction to 1L daily   - Strict I/Os and daily weights      **Prior TTE 3/24/23: LVEF 60-65%, small focal area of hypokinesis in apical inferolateral wall. Dilated RV, septal flattening, PFO by color. MV leaflets are echodense and bileaflet prolapse.    **Recent CCTA@[facility] 6/30/23: calcium score 0, mild annular calcification, mLAD intramyocardial bridge, grossly patent m/dLAD, pLCx, OM1, pRCA.     Problem/Plan - 2:  ·  Problem: CHEIKH (acute kidney injury).   ·  Plan: sCr up to 1.88 from 1.09 over 24 hours. Patient currently non-oliguric and denying any difficulty urinating or residual urinary urgency after voiding as well as no endorsement of clinical symptoms suggestive of urolithiasis. Labs showed increased phosphorus (5.1) but no hyperkalemia or acid-base disorders. Urinary analysis shows Yolanda 57, UOsm 456. Possible pre-renal etiology given extensive diuresis but post-obstructive causes due to dramatic increase over short time cannot be ruled out yet. Bladder scan ordered to r/o. Will start 500cc 0.9% NaCl 100mL/hr and keep trending sCr.    Plan:  - Start 500cc 0.9% NaCl 100mL/hr   - Trend sCr  - Strict I/Os  - see above for fluid restriction and net fluid balance.     Problem/Plan - 3:  ·  Problem: Pulmonary embolism.   ·  Plan: hx of LLE DVT and PE diagnosed in 8/2020. Recommended to be on lifelong anticoagulation per patient, unclear if he has had hypercoagulable work-up.  -- will continue anticoagulation with HOME MED: Xarelto 20mg PO daily.     Problem/Plan - 4:  ·  Problem: Hyperlipidemia.   ·  Plan: Lipid panel wnl. Will continue home lipitor.    -C/w Atorvastatin 10mg PO qhs.     Problem/Plan - 5:  ·  Problem: CAP (community acquired pneumonia).   ·  Plan: (RESOLVED)  Patient currently afebrile and with CBC wnl. D/C'd antibiotics as no longer needed.     Problem/Plan - 6:  ·  Problem: History of COPD.   ·  Plan: continue HOME MEDS: Ventolin HFA 90mcg/inh PRN, Symbicort 160/4.5mcg 2 puffs inh BID and Spiriva inhaled daily.     Problem/Plan - 7:  ·  Problem: HIV infection.   ·  Plan: undetectable VL per patient, will continue HOME MEDS: Descovy 200/25mg PO daily and Tivicay  50 mg PO daily.     Problem/Plan - 8:  ·  Problem: History of substance abuse.   ·  Plan: former heroine addict, quit in 1998. Currently on methadone 85mg QD PO.  - C/w methadone 85mg QD PO.     Problem/Plan - 9:  ·  Problem: BPH (benign prostatic hyperplasia).   ·  Plan: continue HOME MED: Tamsulosin 0.8mg PO daily.     Problem/Plan - 10:  ·  Problem: Nutrition, metabolism, and development symptoms.   ·  Plan; N: DASH with 1 liter fluid restriction. Per nutrition 2 ensures enlive QD  E: Maintain K>4.0 and Mg >2.0  VTE PPx: on Xarelto  Code: Full.        Patient was discharged to: home

## 2023-08-18 NOTE — DISCHARGE NOTE PROVIDER - NSDCCPCAREPLAN_GEN_ALL_CORE_FT
PRINCIPAL DISCHARGE DIAGNOSIS  Diagnosis: CHF exacerbation  Assessment and Plan of Treatment: you were admitted with shortness of breath on exertion after you stopped taking your home Lasix for several months. You were diagnosed with exacerbation of heart failure and put on intravenous Lasix. You will be discharged with oral Lasix 40 mg tablets once a day      SECONDARY DISCHARGE DIAGNOSES  Diagnosis: CHF exacerbation  Assessment and Plan of Treatment:

## 2023-08-18 NOTE — PROGRESS NOTE ADULT - NUTRITIONAL ASSESSMENT
This patient has been assessed with a concern for Malnutrition and has been determined to have a diagnosis/diagnoses of Severe protein-calorie malnutrition.    This patient is being managed with:   Diet DASH/TLC-  Sodium & Cholesterol Restricted  1000mL Fluid Restriction ([id number])  Supplement Feeding Modality:  Oral  Ensure Enlive Cans or Servings Per Day:  2       Frequency:  Daily  Entered: Aug 15 2023  5:23PM  
Patient/Caregiver provided printed discharge information.
This patient has been assessed with a concern for Malnutrition and has been determined to have a diagnosis/diagnoses of Severe protein-calorie malnutrition.    This patient is being managed with:   Diet DASH/TLC-  Sodium & Cholesterol Restricted  1000mL Fluid Restriction ([id number])  Supplement Feeding Modality:  Oral  Ensure Enlive Cans or Servings Per Day:  2       Frequency:  Daily  Entered: Aug 15 2023  5:23PM  
This patient has been assessed with a concern for Malnutrition and has been determined to have a diagnosis/diagnoses of Severe protein-calorie malnutrition.    This patient is being managed with:   Diet DASH/TLC-  Sodium & Cholesterol Restricted  1000mL Fluid Restriction ([id number])  Supplement Feeding Modality:  Oral  Ensure Enlive Cans or Servings Per Day:  2       Frequency:  Daily  Entered: Aug 15 2023  5:23PM

## 2023-08-19 ENCOUNTER — TRANSCRIPTION ENCOUNTER (OUTPATIENT)
Age: 63
End: 2023-08-19

## 2023-08-19 VITALS
TEMPERATURE: 97 F | RESPIRATION RATE: 18 BRPM | SYSTOLIC BLOOD PRESSURE: 115 MMHG | DIASTOLIC BLOOD PRESSURE: 75 MMHG | OXYGEN SATURATION: 94 % | HEART RATE: 75 BPM

## 2023-08-19 LAB
ALBUMIN SERPL ELPH-MCNC: 3.5 G/DL — SIGNIFICANT CHANGE UP (ref 3.3–5)
ALP SERPL-CCNC: 66 U/L — SIGNIFICANT CHANGE UP (ref 40–120)
ALT FLD-CCNC: 16 U/L — SIGNIFICANT CHANGE UP (ref 10–45)
ANION GAP SERPL CALC-SCNC: 5 MMOL/L — SIGNIFICANT CHANGE UP (ref 5–17)
AST SERPL-CCNC: 26 U/L — SIGNIFICANT CHANGE UP (ref 10–40)
BASOPHILS # BLD AUTO: 0.03 K/UL — SIGNIFICANT CHANGE UP (ref 0–0.2)
BASOPHILS NFR BLD AUTO: 0.5 % — SIGNIFICANT CHANGE UP (ref 0–2)
BILIRUB SERPL-MCNC: 0.5 MG/DL — SIGNIFICANT CHANGE UP (ref 0.2–1.2)
BUN SERPL-MCNC: 26 MG/DL — HIGH (ref 7–23)
CALCIUM SERPL-MCNC: 9.9 MG/DL — SIGNIFICANT CHANGE UP (ref 8.4–10.5)
CHLORIDE SERPL-SCNC: 96 MMOL/L — SIGNIFICANT CHANGE UP (ref 96–108)
CO2 SERPL-SCNC: 39 MMOL/L — HIGH (ref 22–31)
CREAT SERPL-MCNC: 1.35 MG/DL — HIGH (ref 0.5–1.3)
EGFR: 58 ML/MIN/1.73M2 — LOW
EOSINOPHIL # BLD AUTO: 0.24 K/UL — SIGNIFICANT CHANGE UP (ref 0–0.5)
EOSINOPHIL NFR BLD AUTO: 3.6 % — SIGNIFICANT CHANGE UP (ref 0–6)
GLUCOSE SERPL-MCNC: 93 MG/DL — SIGNIFICANT CHANGE UP (ref 70–99)
HCT VFR BLD CALC: 46.4 % — SIGNIFICANT CHANGE UP (ref 39–50)
HGB BLD-MCNC: 14.8 G/DL — SIGNIFICANT CHANGE UP (ref 13–17)
IMM GRANULOCYTES NFR BLD AUTO: 0.3 % — SIGNIFICANT CHANGE UP (ref 0–0.9)
LYMPHOCYTES # BLD AUTO: 3.11 K/UL — SIGNIFICANT CHANGE UP (ref 1–3.3)
LYMPHOCYTES # BLD AUTO: 47.1 % — HIGH (ref 13–44)
MAGNESIUM SERPL-MCNC: 1.9 MG/DL — SIGNIFICANT CHANGE UP (ref 1.6–2.6)
MCHC RBC-ENTMCNC: 31.9 GM/DL — LOW (ref 32–36)
MCHC RBC-ENTMCNC: 32 PG — SIGNIFICANT CHANGE UP (ref 27–34)
MCV RBC AUTO: 100.2 FL — HIGH (ref 80–100)
MONOCYTES # BLD AUTO: 0.8 K/UL — SIGNIFICANT CHANGE UP (ref 0–0.9)
MONOCYTES NFR BLD AUTO: 12.1 % — SIGNIFICANT CHANGE UP (ref 2–14)
NEUTROPHILS # BLD AUTO: 2.4 K/UL — SIGNIFICANT CHANGE UP (ref 1.8–7.4)
NEUTROPHILS NFR BLD AUTO: 36.4 % — LOW (ref 43–77)
NRBC # BLD: 0 /100 WBCS — SIGNIFICANT CHANGE UP (ref 0–0)
PHOSPHATE SERPL-MCNC: 3.3 MG/DL — SIGNIFICANT CHANGE UP (ref 2.5–4.5)
PLATELET # BLD AUTO: 248 K/UL — SIGNIFICANT CHANGE UP (ref 150–400)
POTASSIUM SERPL-MCNC: 5.2 MMOL/L — SIGNIFICANT CHANGE UP (ref 3.5–5.3)
POTASSIUM SERPL-SCNC: 5.2 MMOL/L — SIGNIFICANT CHANGE UP (ref 3.5–5.3)
PROT SERPL-MCNC: 8.1 G/DL — SIGNIFICANT CHANGE UP (ref 6–8.3)
RBC # BLD: 4.63 M/UL — SIGNIFICANT CHANGE UP (ref 4.2–5.8)
RBC # FLD: 14.7 % — HIGH (ref 10.3–14.5)
SODIUM SERPL-SCNC: 140 MMOL/L — SIGNIFICANT CHANGE UP (ref 135–145)
WBC # BLD: 6.6 K/UL — SIGNIFICANT CHANGE UP (ref 3.8–10.5)
WBC # FLD AUTO: 6.6 K/UL — SIGNIFICANT CHANGE UP (ref 3.8–10.5)

## 2023-08-19 PROCEDURE — 87521 HEPATITIS C PROBE&RVRS TRNSC: CPT

## 2023-08-19 PROCEDURE — 86803 HEPATITIS C AB TEST: CPT

## 2023-08-19 PROCEDURE — 85027 COMPLETE CBC AUTOMATED: CPT

## 2023-08-19 PROCEDURE — 0225U NFCT DS DNA&RNA 21 SARSCOV2: CPT

## 2023-08-19 PROCEDURE — 84133 ASSAY OF URINE POTASSIUM: CPT

## 2023-08-19 PROCEDURE — 87040 BLOOD CULTURE FOR BACTERIA: CPT

## 2023-08-19 PROCEDURE — 36415 COLL VENOUS BLD VENIPUNCTURE: CPT

## 2023-08-19 PROCEDURE — 96375 TX/PRO/DX INJ NEW DRUG ADDON: CPT

## 2023-08-19 PROCEDURE — 84300 ASSAY OF URINE SODIUM: CPT

## 2023-08-19 PROCEDURE — 84145 PROCALCITONIN (PCT): CPT

## 2023-08-19 PROCEDURE — 83036 HEMOGLOBIN GLYCOSYLATED A1C: CPT

## 2023-08-19 PROCEDURE — 80048 BASIC METABOLIC PNL TOTAL CA: CPT

## 2023-08-19 PROCEDURE — 97530 THERAPEUTIC ACTIVITIES: CPT

## 2023-08-19 PROCEDURE — 93306 TTE W/DOPPLER COMPLETE: CPT

## 2023-08-19 PROCEDURE — 82803 BLOOD GASES ANY COMBINATION: CPT

## 2023-08-19 PROCEDURE — 82570 ASSAY OF URINE CREATININE: CPT

## 2023-08-19 PROCEDURE — 83735 ASSAY OF MAGNESIUM: CPT

## 2023-08-19 PROCEDURE — 80053 COMPREHEN METABOLIC PANEL: CPT

## 2023-08-19 PROCEDURE — 82553 CREATINE MB FRACTION: CPT

## 2023-08-19 PROCEDURE — 84484 ASSAY OF TROPONIN QUANT: CPT

## 2023-08-19 PROCEDURE — 83935 ASSAY OF URINE OSMOLALITY: CPT

## 2023-08-19 PROCEDURE — 99285 EMERGENCY DEPT VISIT HI MDM: CPT

## 2023-08-19 PROCEDURE — 86703 HIV-1/HIV-2 1 RESULT ANTBDY: CPT

## 2023-08-19 PROCEDURE — 99239 HOSP IP/OBS DSCHRG MGMT >30: CPT

## 2023-08-19 PROCEDURE — 84156 ASSAY OF PROTEIN URINE: CPT

## 2023-08-19 PROCEDURE — 97535 SELF CARE MNGMENT TRAINING: CPT

## 2023-08-19 PROCEDURE — 71045 X-RAY EXAM CHEST 1 VIEW: CPT

## 2023-08-19 PROCEDURE — 85025 COMPLETE CBC W/AUTO DIFF WBC: CPT

## 2023-08-19 PROCEDURE — 87637 SARSCOV2&INF A&B&RSV AMP PRB: CPT

## 2023-08-19 PROCEDURE — 85730 THROMBOPLASTIN TIME PARTIAL: CPT

## 2023-08-19 PROCEDURE — 82550 ASSAY OF CK (CPK): CPT

## 2023-08-19 PROCEDURE — 97162 PT EVAL MOD COMPLEX 30 MIN: CPT

## 2023-08-19 PROCEDURE — 94640 AIRWAY INHALATION TREATMENT: CPT

## 2023-08-19 PROCEDURE — 85610 PROTHROMBIN TIME: CPT

## 2023-08-19 PROCEDURE — 97116 GAIT TRAINING THERAPY: CPT

## 2023-08-19 PROCEDURE — 84100 ASSAY OF PHOSPHORUS: CPT

## 2023-08-19 PROCEDURE — 84540 ASSAY OF URINE/UREA-N: CPT

## 2023-08-19 PROCEDURE — 83880 ASSAY OF NATRIURETIC PEPTIDE: CPT

## 2023-08-19 PROCEDURE — 80061 LIPID PANEL: CPT

## 2023-08-19 PROCEDURE — 96365 THER/PROPH/DIAG IV INF INIT: CPT

## 2023-08-19 PROCEDURE — 97165 OT EVAL LOW COMPLEX 30 MIN: CPT

## 2023-08-19 RX ORDER — METHADONE HYDROCHLORIDE 40 MG/1
17 TABLET ORAL
Qty: 0 | Refills: 0 | DISCHARGE
Start: 2023-08-19

## 2023-08-19 RX ORDER — FUROSEMIDE 40 MG
1 TABLET ORAL
Qty: 60 | Refills: 0
Start: 2023-08-19 | End: 2023-10-17

## 2023-08-19 RX ORDER — FUROSEMIDE 40 MG
1 TABLET ORAL
Refills: 0 | DISCHARGE

## 2023-08-19 RX ADMIN — METHADONE HYDROCHLORIDE 85 MILLIGRAM(S): 40 TABLET ORAL at 11:10

## 2023-08-19 RX ADMIN — DOLUTEGRAVIR SODIUM 50 MILLIGRAM(S): 25 TABLET, FILM COATED ORAL at 11:10

## 2023-08-19 RX ADMIN — TIOTROPIUM BROMIDE 2 PUFF(S): 18 CAPSULE ORAL; RESPIRATORY (INHALATION) at 11:11

## 2023-08-19 RX ADMIN — EMTRICITABINE AND TENOFOVIR DISOPROXIL FUMARATE 1 TABLET(S): 200; 300 TABLET, FILM COATED ORAL at 11:10

## 2023-08-19 RX ADMIN — BUDESONIDE AND FORMOTEROL FUMARATE DIHYDRATE 2 PUFF(S): 160; 4.5 AEROSOL RESPIRATORY (INHALATION) at 06:27

## 2023-08-19 NOTE — DISCHARGE NOTE NURSING/CASE MANAGEMENT/SOCIAL WORK - NSDCPEEMAIL_GEN_ALL_CORE
[facility] for Tobacco Control email [email or web address]

## 2023-08-19 NOTE — DISCHARGE NOTE NURSING/CASE MANAGEMENT/SOCIAL WORK - NSDCPEFALRISK_GEN_ALL_CORE
For information on Fall & Injury Prevention, visit: [email or web address] OR  [email or web address] OR  https://www.cdc.gov/steadi/patient.html

## 2023-08-19 NOTE — DISCHARGE NOTE NURSING/CASE MANAGEMENT/SOCIAL WORK - NSDCPEWEB_GEN_ALL_CORE
[facility] for Tobacco Control website --- [email or web address] website --- [email or web address]

## 2023-08-19 NOTE — DISCHARGE NOTE NURSING/CASE MANAGEMENT/SOCIAL WORK - PATIENT PORTAL LINK FT
You can access the FollowMyHealth Patient Portal offered by [facility] by registering at the following website: [email or web address]. By joining [facility]’s FollowMyHealth portal, you will also be able to view your health information using other applications (apps) compatible with our system.

## 2023-08-20 LAB
CULTURE RESULTS: SIGNIFICANT CHANGE UP
CULTURE RESULTS: SIGNIFICANT CHANGE UP
SPECIMEN SOURCE: SIGNIFICANT CHANGE UP
SPECIMEN SOURCE: SIGNIFICANT CHANGE UP

## 2023-08-23 DIAGNOSIS — I11.0 HYPERTENSIVE HEART DISEASE WITH HEART FAILURE: ICD-10-CM

## 2023-08-23 DIAGNOSIS — Z21 ASYMPTOMATIC HUMAN IMMUNODEFICIENCY VIRUS [HIV] INFECTION STATUS: ICD-10-CM

## 2023-08-23 DIAGNOSIS — J18.9 PNEUMONIA, UNSPECIFIED ORGANISM: ICD-10-CM

## 2023-08-23 DIAGNOSIS — T50.906A UNDERDOSING OF UNSPECIFIED DRUGS, MEDICAMENTS AND BIOLOGICAL SUBSTANCES, INITIAL ENCOUNTER: ICD-10-CM

## 2023-08-23 DIAGNOSIS — Z79.01 LONG TERM (CURRENT) USE OF ANTICOAGULANTS: ICD-10-CM
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
## 2023-08-23 DIAGNOSIS — Z20.822 CONTACT WITH AND (SUSPECTED) EXPOSURE TO COVID-19: ICD-10-CM

## 2023-08-23 DIAGNOSIS — Z79.899 OTHER LONG TERM (CURRENT) DRUG THERAPY: ICD-10-CM

## 2023-08-23 DIAGNOSIS — N17.9 ACUTE KIDNEY FAILURE, UNSPECIFIED: ICD-10-CM

## 2023-08-23 DIAGNOSIS — Z41.8 ENCOUNTER FOR OTHER PROCEDURES FOR PURPOSES OTHER THAN REMEDYING HEALTH STATE: ICD-10-CM

## 2023-08-23 DIAGNOSIS — N40.0 BENIGN PROSTATIC HYPERPLASIA WITHOUT LOWER URINARY TRACT SYMPTOMS: ICD-10-CM

## 2023-08-23 DIAGNOSIS — E78.5 HYPERLIPIDEMIA, UNSPECIFIED: ICD-10-CM

## 2023-08-23 DIAGNOSIS — Z91.148 PATIENT'S OTHER NONCOMPLIANCE WITH MEDICATION REGIMEN FOR OTHER REASON: ICD-10-CM

## 2023-08-23 DIAGNOSIS — Z86.711 PERSONAL HISTORY OF PULMONARY EMBOLISM: ICD-10-CM

## 2023-08-23 DIAGNOSIS — F11.20 OPIOID DEPENDENCE, UNCOMPLICATED: ICD-10-CM

## 2023-08-23 DIAGNOSIS — I50.33 ACUTE ON CHRONIC DIASTOLIC (CONGESTIVE) HEART FAILURE: ICD-10-CM

## 2023-08-23 DIAGNOSIS — F17.200 NICOTINE DEPENDENCE, UNSPECIFIED, UNCOMPLICATED: ICD-10-CM

## 2023-08-23 DIAGNOSIS — E43 UNSPECIFIED SEVERE PROTEIN-CALORIE MALNUTRITION: ICD-10-CM

## 2023-08-23 DIAGNOSIS — Z86.718 PERSONAL HISTORY OF OTHER VENOUS THROMBOSIS AND EMBOLISM: ICD-10-CM

## 2023-08-23 DIAGNOSIS — E83.42 HYPOMAGNESEMIA: ICD-10-CM

## 2023-09-22 ENCOUNTER — APPOINTMENT (OUTPATIENT)
Dept: HEART AND VASCULAR | Facility: CLINIC | Age: 63
End: 2023-09-22

## 2023-10-26 ENCOUNTER — APPOINTMENT (OUTPATIENT)
Dept: HOME HEALTH SERVICES | Facility: HOME HEALTH | Age: 63
End: 2023-10-26
Payer: MEDICARE

## 2023-10-26 VITALS
TEMPERATURE: 98.3 F | HEART RATE: 88 BPM | HEIGHT: 70 IN | SYSTOLIC BLOOD PRESSURE: 122 MMHG | WEIGHT: 170 LBS | RESPIRATION RATE: 18 BRPM | BODY MASS INDEX: 24.34 KG/M2 | OXYGEN SATURATION: 93 % | DIASTOLIC BLOOD PRESSURE: 78 MMHG

## 2023-10-26 DIAGNOSIS — N40.0 BENIGN PROSTATIC HYPERPLASIA WITHOUT LOWER URINARY TRACT SYMPMS: ICD-10-CM

## 2023-10-26 DIAGNOSIS — Z87.898 PERSONAL HISTORY OF OTHER SPECIFIED CONDITIONS: ICD-10-CM

## 2023-10-26 DIAGNOSIS — E55.9 VITAMIN D DEFICIENCY, UNSPECIFIED: ICD-10-CM

## 2023-10-26 DIAGNOSIS — F17.200 NICOTINE DEPENDENCE, UNSPECIFIED, UNCOMPLICATED: ICD-10-CM

## 2023-10-26 DIAGNOSIS — Z86.711 PERSONAL HISTORY OF PULMONARY EMBOLISM: ICD-10-CM

## 2023-10-26 DIAGNOSIS — Z00.00 ENCOUNTER FOR GENERAL ADULT MEDICAL EXAMINATION W/OUT ABNORMAL FINDINGS: ICD-10-CM

## 2023-10-26 DIAGNOSIS — E78.5 HYPERLIPIDEMIA, UNSPECIFIED: ICD-10-CM

## 2023-10-26 DIAGNOSIS — Z80.1 FAMILY HISTORY OF MALIGNANT NEOPLASM OF TRACHEA, BRONCHUS AND LUNG: ICD-10-CM

## 2023-10-26 DIAGNOSIS — Z23 ENCOUNTER FOR IMMUNIZATION: ICD-10-CM

## 2023-10-26 PROCEDURE — 90662 IIV NO PRSV INCREASED AG IM: CPT

## 2023-10-26 PROCEDURE — 99344 HOME/RES VST NEW MOD MDM 60: CPT

## 2023-10-26 PROCEDURE — G0008: CPT

## 2023-10-27 PROBLEM — Z00.00 HEALTHCARE MAINTENANCE: Status: ACTIVE | Noted: 2023-10-27

## 2023-10-27 PROBLEM — Z23 ENCOUNTER FOR IMMUNIZATION: Status: ACTIVE | Noted: 2023-10-26 | Resolved: 2023-11-09

## 2023-10-27 PROBLEM — E78.5 HYPERLIPIDEMIA: Status: ACTIVE | Noted: 2023-10-26

## 2023-10-27 PROBLEM — N40.0 BPH (BENIGN PROSTATIC HYPERPLASIA): Status: ACTIVE | Noted: 2023-10-26

## 2023-10-27 PROBLEM — E55.9 VITAMIN D DEFICIENCY: Status: ACTIVE | Noted: 2023-10-27

## 2023-10-27 RX ORDER — DOLUTEGRAVIR SODIUM 50 MG/1
50 TABLET, FILM COATED ORAL DAILY
Qty: 90 | Refills: 3 | Status: ACTIVE | COMMUNITY
Start: 2023-10-27

## 2023-10-27 RX ORDER — ATORVASTATIN CALCIUM 10 MG/1
10 TABLET, FILM COATED ORAL DAILY
Qty: 90 | Refills: 3 | Status: ACTIVE | COMMUNITY
Start: 2023-10-27

## 2023-10-27 RX ORDER — RIVAROXABAN 20 MG/1
20 TABLET, FILM COATED ORAL DAILY
Qty: 90 | Refills: 3 | Status: ACTIVE | COMMUNITY
Start: 2023-10-27

## 2023-10-27 RX ORDER — FUROSEMIDE 20 MG/1
20 TABLET ORAL DAILY
Qty: 180 | Refills: 3 | Status: ACTIVE | COMMUNITY
Start: 2023-10-27 | End: 1900-01-01

## 2023-10-27 RX ORDER — TIOTROPIUM BROMIDE 18 UG/1
18 CAPSULE ORAL; RESPIRATORY (INHALATION) DAILY
Refills: 0 | Status: ACTIVE | COMMUNITY
Start: 2023-10-27

## 2023-10-27 RX ORDER — TAMSULOSIN HYDROCHLORIDE 0.4 MG/1
0.4 CAPSULE ORAL DAILY
Qty: 180 | Refills: 3 | Status: ACTIVE | COMMUNITY
Start: 2023-10-27

## 2023-10-27 RX ORDER — EMTRICITABINE AND TENOFOVIR ALAFENAMIDE 200; 25 MG/1; MG/1
200-25 TABLET ORAL DAILY
Qty: 90 | Refills: 3 | Status: ACTIVE | COMMUNITY
Start: 2023-10-27

## 2023-11-02 ENCOUNTER — LABORATORY RESULT (OUTPATIENT)
Age: 63
End: 2023-11-02

## 2023-11-03 ENCOUNTER — NON-APPOINTMENT (OUTPATIENT)
Age: 63
End: 2023-11-03

## 2023-11-03 RX ORDER — ERGOCALCIFEROL 1.25 MG/1
1.25 MG CAPSULE, LIQUID FILLED ORAL
Qty: 4 | Refills: 5 | Status: ACTIVE | COMMUNITY
Start: 2023-11-03 | End: 1900-01-01

## 2023-11-08 ENCOUNTER — NON-APPOINTMENT (OUTPATIENT)
Age: 63
End: 2023-11-08

## 2023-11-13 RX ORDER — BUDESONIDE AND FORMOTEROL FUMARATE DIHYDRATE 160; 4.5 UG/1; UG/1
160-4.5 AEROSOL RESPIRATORY (INHALATION) TWICE DAILY
Qty: 2 | Refills: 3 | Status: ACTIVE | COMMUNITY
Start: 2023-10-27 | End: 1900-01-01

## 2023-11-14 ENCOUNTER — NON-APPOINTMENT (OUTPATIENT)
Age: 63
End: 2023-11-14

## 2023-11-15 ENCOUNTER — NON-APPOINTMENT (OUTPATIENT)
Age: 63
End: 2023-11-15

## 2023-11-16 ENCOUNTER — NON-APPOINTMENT (OUTPATIENT)
Age: 63
End: 2023-11-16

## 2023-12-13 ENCOUNTER — NON-APPOINTMENT (OUTPATIENT)
Age: 63
End: 2023-12-13

## 2023-12-13 RX ORDER — ALBUTEROL SULFATE 90 UG/1
108 (90 BASE) AEROSOL, METERED RESPIRATORY (INHALATION)
Qty: 1 | Refills: 3 | Status: ACTIVE | COMMUNITY
Start: 2023-11-13 | End: 1900-01-01

## 2024-01-03 ENCOUNTER — NON-APPOINTMENT (OUTPATIENT)
Age: 64
End: 2024-01-03

## 2024-01-24 ENCOUNTER — APPOINTMENT (OUTPATIENT)
Dept: HOME HEALTH SERVICES | Facility: HOME HEALTH | Age: 64
End: 2024-01-24

## 2024-01-24 NOTE — PHYSICAL EXAM
[de-identified] : sitting upright in rolling walker [de-identified] : ear wax to bilateral ears, difficult to visualize TM [de-identified] : hunched forward, neck flexed. able to straighten out but reports fatigue [de-identified] : expiratory wheezing, fingernails w/ clubbing.  [de-identified] : 3+ bilateral lower extremity edema. unable to palpate for pedal pulses as patient declines to take off shoes and socks today [de-identified] : legs w/ dry skin [de-identified] : no gross deficit, face symmetrical, no ptosis

## 2024-01-24 NOTE — HISTORY OF PRESENT ILLNESS
[FreeTextEntry1] : COPD [FreeTextEntry2] :  Mr. PANFILO WONG is a 63 year male who has multiple medical conditions including; CHF, emphysema, COPD, BPH, HIV DVT/PE on Xarelto, substance use now on MMTP.   Patient reports overall acceptable status of health, however has multiple chronic conditions for which he has difficulty seeing specialists for. Due to his SOB, he cannot take the subway. Is relying on medical transportation through health insurance but it has a limited number of rides covered and it is unreliable. He therefore has not made it to all of his appointments.   He reports consistent lower extremity edema. Used to wear compression stockings but now they are too painful. Does not have scale at home - not able to weigh himself daily. Reports good compliance with Lasix because knows that "his lungs will fill up with fluid" if he doesn't take it. Reports today his legs are swollen but at his baseline.  Reports compliance with his maintenance inhalers. Uses Albuterol daily for coughing/wheezing.   Patient reports that 3 years ago he was independent, able to work as a . However had episode where "it was hard to keep my head up" and "was walking like I'm drunk". Since then with progressive functional and medical decline. Patient unsure whether he has had a CVA or neurology workup. Working with PT 2x/week. Still w/ difficulty keeping head up for a prolonged period.   History of substance abuse. Is on MMTP - goes to clinic at [facility], on 70mg daily. Clinic will be closing soon and he will transfer to another on [address].   Goals of Care - patient expressing that he does not want to talk about this in detail. Would like to have everything done for now.   Patient is active smoker - has tried nicotine patches and Chantix in the past with no effect. Has been smoking since he was 16, now at 3 cigarettes per day. Is amenable to try quitting again.   Using advil several times per week for general aches and pains.   Reports 20 lb weight loss over the last year but attributes it to now eating 1 meal / day. Prior to this used to go to day program where he would get multiple meals/day.  Expressing desire to have [facility] be primary PCP.  Emergency Contact - Panfilo Wong . No phone number at this time.

## 2024-01-24 NOTE — HEALTH RISK ASSESSMENT
[TimeGetUpGo] : 17 [de-identified] : ambulates w/ RW Mirvaso Counseling: Mirvaso is a topical medication which can decrease superficial blood flow where applied. Side effects are uncommon and include stinging, redness and allergic reactions.

## 2024-01-24 NOTE — REVIEW OF SYSTEMS
[Chest Pain] : no chest pain [FreeTextEntry2] : 20 lb weight loss [FreeTextEntry3] : wears prescription glasses [FreeTextEntry5] : +FUNES [FreeTextEntry9] : difficulty holding neck up x 3 years [de-identified] : dryness

## 2024-02-09 ENCOUNTER — APPOINTMENT (OUTPATIENT)
Dept: HOME HEALTH SERVICES | Facility: HOME HEALTH | Age: 64
End: 2024-02-09
Payer: MEDICARE

## 2024-02-09 VITALS
HEIGHT: 70 IN | OXYGEN SATURATION: 80 % | BODY MASS INDEX: 20.04 KG/M2 | HEART RATE: 90 BPM | RESPIRATION RATE: 20 BRPM | DIASTOLIC BLOOD PRESSURE: 62 MMHG | WEIGHT: 140 LBS | SYSTOLIC BLOOD PRESSURE: 108 MMHG | TEMPERATURE: 98.6 F

## 2024-02-09 VITALS — OXYGEN SATURATION: 94 % | HEART RATE: 88 BPM

## 2024-02-09 DIAGNOSIS — K55.059 ACUTE (REVERSIBLE) ISCHEMIA OF INTESTINE, PART AND EXTENT UNSPECIFIED: ICD-10-CM

## 2024-02-09 DIAGNOSIS — F11.20 OPIOID DEPENDENCE, UNCOMPLICATED: ICD-10-CM

## 2024-02-09 DIAGNOSIS — F42.3 HOARDING DISORDER: ICD-10-CM

## 2024-02-09 DIAGNOSIS — R00.2 PALPITATIONS: ICD-10-CM

## 2024-02-09 DIAGNOSIS — J47.9 BRONCHIECTASIS, UNCOMPLICATED: ICD-10-CM

## 2024-02-09 DIAGNOSIS — K86.89 OTHER SPECIFIED DISEASES OF PANCREAS: ICD-10-CM

## 2024-02-09 DIAGNOSIS — J84.10 PULMONARY FIBROSIS, UNSPECIFIED: ICD-10-CM

## 2024-02-09 DIAGNOSIS — K83.8 OTHER SPECIFIED DISEASES OF BILIARY TRACT: ICD-10-CM

## 2024-02-09 DIAGNOSIS — R63.4 ABNORMAL WEIGHT LOSS: ICD-10-CM

## 2024-02-09 PROCEDURE — 99350 HOME/RES VST EST HIGH MDM 60: CPT

## 2024-02-09 RX ORDER — TORSEMIDE 20 MG/1
20 TABLET ORAL DAILY
Refills: 0 | Status: ACTIVE | COMMUNITY
Start: 2024-02-09

## 2024-02-09 RX ORDER — MIRTAZAPINE 30 MG/1
30 TABLET, FILM COATED ORAL
Qty: 90 | Refills: 3 | Status: ACTIVE | COMMUNITY
Start: 2024-02-09

## 2024-02-09 RX ORDER — EMPAGLIFLOZIN 10 MG/1
10 TABLET, FILM COATED ORAL DAILY
Qty: 30 | Refills: 0 | Status: ACTIVE | COMMUNITY
Start: 2024-02-09

## 2024-02-09 RX ORDER — GABAPENTIN 300 MG/1
300 CAPSULE ORAL 3 TIMES DAILY
Refills: 0 | Status: ACTIVE | COMMUNITY
Start: 2024-02-09

## 2024-02-09 NOTE — HISTORY OF PRESENT ILLNESS
[Patient] : patient [FreeTextEntry4] : see below [FreeTextEntry1] : COPD [FreeTextEntry2] : Mr. HAIR WONG is a 64 year male who has multiple medical conditions including: CHF, emphysema, COPD, pulmonary fibrosis, BPH, HIV, substance use now on MMTP. Seen today for f/u visit.  Since last visit 3 months ago patient with; Hospitalization at [facility] x 2 weeks for CHF. Has now been placed on home 02, does not have a concentrator, only tank which he cannot take with him because doesn't fit on walker. Had trouble contacting [facility], resumed care from his previous PCP at [facility] Dr. Stevens. Also resumed care with cardiologist at [facility] Transitioned from Furosemide to Torsemide and Jardiance. Continues to report significant FUNES.  With occasional palpitations during ambulation, pre-syncopal sensation. Resolves w/ rest. "This has been happening for a while". Had a holter monitor from his cardiologist but had trouble attaching it.  Still with neck weakness, inability to keep head up. Also with weakness to legs that becomes worse w/ ambulation. Receiving meals from [facility], reporting better PO intake.  Continuing to have unintentional weight loss. During last visit believed it was due to poor PO intake. Recent weight 140lb (down from 170lb last visit).   COVID SCREEN: Feb 9 2024  1:00PM Patient or caretaker denies fever, cough, trouble breathing, rash, vomiting. Patient has not been in close contact with anyone who is COVID-19 positive, or suspected of having COVID-19. N95 mask, gloves, eye wear and gown (if indicated) used during visit: Yes. Total face to face time with patient is 45 min.

## 2024-02-09 NOTE — HEALTH RISK ASSESSMENT
[HRA Reviewed] : Health risk assessment reviewed [Independent] : managing finances [Some assistance needed] : doing laundry [One fall no injury in past year] : Patient reported one fall in the past year without injury [TimeGetUpGo] : 17 [de-identified] : ambulates w/ RW

## 2024-02-09 NOTE — REASON FOR VISIT
[Follow-Up] : a follow-up visit [Pre-Visit Preparation] : pre-visit preparation was done [Intercurrent Specialty/Sub-specialty Visits] : the patient has intercurrent specialty/sub-specialty visits [FreeTextEntry2] : chart and HIE review [FreeTextEntry3] : PCP, Cardiologist, Pulmonologist

## 2024-02-09 NOTE — PHYSICAL EXAM
[No Acute Distress] : no acute distress [Normal Sclera/Conjunctiva] : normal sclera/conjunctiva [Normal Outer Ear/Nose] : the ears and nose were normal in appearance [No Accessory Muscle Use] : no accessory muscle use [Normal Rate] : heart rate was normal  [Regular Rhythm] : with a regular rhythm [Breast Exam Declined] : patient declined to have breast exam done [Normal S1, S2] : normal S1 and S2 [Normal Bowel Sounds] : normal bowel sounds [Non Tender] : non-tender [Soft] : abdomen soft [Not Distended] : not distended [Patient Refused] : rectal exam was refused by the patient [Normal Supraclavicular Nodes] : no supraclavicular lymphadenopathy [No CVA Tenderness] : no ~M costovertebral angle tenderness [No Spinal Tenderness] : no spinal tenderness [Normal Strength/Tone] : muscle strength and tone were normal [Oriented x3] : oriented to person, place, and time [de-identified] : sitting upright in rolling walker appearing older than stated age [de-identified] : no ptosis w/ prolonged upward gaze [de-identified] : ear wax to bilateral ears, difficult to visualize TM [de-identified] : hunched forward, neck flexed. able to straighten out but reports fatigue [de-identified] : rhales throughout, fingernails w/ clubbing.  [de-identified] : 3+ bilateral lower extremity edema. unable to palpate for pedal pulses as patient declines to take off shoes and socks today [de-identified] : no discernable fatiguability noted to shoulders or hips. stooped forward gait.  [de-identified] : legs w/ dry skin [de-identified] : no gross deficit, face symmetrical, strength 5/5 to all extremities, sensation intact

## 2024-02-09 NOTE — DATA REVIEWED
[FreeTextEntry1] : Assisted patient in logging into his [facility].  Upon Review; CT Chest w/out contrast (2023); Stable interstitial lung disease with fibrotic changes superimposed on emphysema. No new focal consolidation or suspicious nodule. Stable dilatation of the main pulmonary artery suggesting pulmonary hypertension.  Xray (2023); Scattered bilateral patchy opacities in the setting of known diffuse ILD. Most of the abnormalities are chronic but acute process cannot be excluded. No pleural effusion or pneumothorax. Old left rib traumatic fractures.   Echo (2023); Normal LV size and function. Indeterminate diastolic fx. Normal PV flow pattern suggestive of normal left sided filling pressure. Severe RV dilatation with reduced function. Deilater RA. Normal LA size. No significant valvular disease. Mobile intra-atrial septum with suspected atrial shunt. Consider FLORESITA.

## 2024-02-09 NOTE — CHRONIC CARE ASSESSMENT
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

## 2024-02-09 NOTE — REVIEW OF SYSTEMS
[Fatigue] : fatigue [Vision Problems] : vision problems [Lower Ext Edema] : lower extremity edema [Shortness Of Breath] : shortness of breath [Wheezing] : wheezing [Cough] : cough [Dyspnea on Exertion] : dyspnea on exertion [Frequency] : frequency [Joint Stiffness] : joint stiffness [Muscle Weakness] : muscle weakness [Unsteady Walk] : ataxia [Negative] : Gastrointestinal [Chest Pain] : no chest pain [FreeTextEntry2] : weight loss [FreeTextEntry3] : wears prescription glasses [FreeTextEntry5] : +FUNES [FreeTextEntry9] : difficulty holding neck up x 3 years. Leg weakness w/ prolonged ambulation [de-identified] : dryness

## 2024-02-09 NOTE — ASSESSMENT
[FreeTextEntry1] : - will reach out to Dr. Stevens (PCP at [facility]) to coordinate care - strongly encouraged patient to follow up with specialists and get neuro referral - spent a significant amount of time going over records in MyChart and explaining previous results to patient - Initially patient alarmingly hypoxic however not in respiratory distress. Oxygen saturation improved w/ supplemental 02. Will task DME for concentrator and humidifier.

## 2024-02-09 NOTE — COUNSELING
[Normal Weight - ( BMI  <25 )] : normal weight - ( BMI  <25 ) [Mediterranean diet recommended] : Mediterranean diet recommended [Smoker, interested in quitting; smoking cessation counseling given (5 mins) and resources provided] : smoker, interested in quitting; smoking cessation counseling given (5 minutes) and resources provided [Smoke/CO Detectors] : smoke/CO detectors [Use assistive device to avoid falls] : use assistive device to avoid falls [Date: ___] : HIV screening completed on [unfilled] [] : foot exam

## 2024-03-19 ENCOUNTER — APPOINTMENT (OUTPATIENT)
Dept: HOME HEALTH SERVICES | Facility: HOME HEALTH | Age: 64
End: 2024-03-19

## 2024-05-03 ENCOUNTER — APPOINTMENT (OUTPATIENT)
Dept: HOME HEALTH SERVICES | Facility: HOME HEALTH | Age: 64
End: 2024-05-03
Payer: MEDICARE

## 2024-05-03 VITALS
TEMPERATURE: 99.5 F | HEART RATE: 96 BPM | OXYGEN SATURATION: 96 % | RESPIRATION RATE: 20 BRPM | DIASTOLIC BLOOD PRESSURE: 80 MMHG | SYSTOLIC BLOOD PRESSURE: 122 MMHG

## 2024-05-03 DIAGNOSIS — B20 HUMAN IMMUNODEFICIENCY VIRUS [HIV] DISEASE: ICD-10-CM

## 2024-05-03 DIAGNOSIS — M53.82 OTHER SPECIFIED DORSOPATHIES, CERVICAL REGION: ICD-10-CM

## 2024-05-03 DIAGNOSIS — I82.409 ACUTE EMBOLISM AND THROMBOSIS OF UNSPECIFIED DEEP VEINS OF UNSPECIFIED LOWER EXTREMITY: ICD-10-CM

## 2024-05-03 DIAGNOSIS — J84.9 INTERSTITIAL PULMONARY DISEASE, UNSPECIFIED: ICD-10-CM

## 2024-05-03 DIAGNOSIS — R60.0 LOCALIZED EDEMA: ICD-10-CM

## 2024-05-03 DIAGNOSIS — J43.9 EMPHYSEMA, UNSPECIFIED: ICD-10-CM

## 2024-05-03 DIAGNOSIS — Z71.89 OTHER SPECIFIED COUNSELING: ICD-10-CM

## 2024-05-03 DIAGNOSIS — I50.30 UNSPECIFIED DIASTOLIC (CONGESTIVE) HEART FAILURE: ICD-10-CM

## 2024-05-03 DIAGNOSIS — Z99.81 DEPENDENCE ON SUPPLEMENTAL OXYGEN: ICD-10-CM

## 2024-05-03 PROCEDURE — 99497 ADVNCD CARE PLAN 30 MIN: CPT

## 2024-05-03 PROCEDURE — 99349 HOME/RES VST EST MOD MDM 40: CPT

## 2024-05-03 NOTE — CHRONIC CARE ASSESSMENT
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

## 2024-05-03 NOTE — PHYSICAL EXAM
[No Acute Distress] : no acute distress [Normal Sclera/Conjunctiva] : normal sclera/conjunctiva [Normal Outer Ear/Nose] : the ears and nose were normal in appearance [No Accessory Muscle Use] : no accessory muscle use [Normal Rate] : heart rate was normal  [Regular Rhythm] : with a regular rhythm [Normal S1, S2] : normal S1 and S2 [Breast Exam Declined] : patient declined to have breast exam done [Normal Bowel Sounds] : normal bowel sounds [Non Tender] : non-tender [Soft] : abdomen soft [Not Distended] : not distended [Patient Refused] : rectal exam was refused by the patient [Normal Supraclavicular Nodes] : no supraclavicular lymphadenopathy [Normal Anterior Cervical Nodes] : no anterior cervical lymphadenopathy [No CVA Tenderness] : no ~M costovertebral angle tenderness [No Spinal Tenderness] : no spinal tenderness [Normal Strength/Tone] : muscle strength and tone were normal [Cranial Nerves Intact] : cranial nerves 2-12 were intact [Oriented x3] : oriented to person, place, and time [de-identified] : sitting upright in rolling walker appearing older than stated age [de-identified] : ear wax to bilateral ears, difficult to visualize TM [de-identified] : hunched forward, neck flexed. able to straighten out but reports fatigue. hypertrophic posterior neck muscles [de-identified] : rhales throughout - improved from prior, fingernails w/ clubbing. [de-identified] : 2-3+ bilateral lower extremity edema. unable to palpate for pedal pulses as patient declines to take off shoes and socks today [de-identified] : no discernable fatiguability noted to shoulders or hips. stooped forward gait.  [de-identified] : legs w/ dry skin [de-identified] : no gross deficit, face symmetrical, strength 5/5 to all extremities, sensation intact

## 2024-05-03 NOTE — HEALTH RISK ASSESSMENT
[HRA Reviewed] : Health risk assessment reviewed [Independent] : managing finances [Some assistance needed] : using transportation [One fall no injury in past year] : Patient reported one fall in the past year without injury [TimeGetUpGo] : 17 [de-identified] : ambulates w/ RW or cane

## 2024-05-03 NOTE — REVIEW OF SYSTEMS
[Fatigue] : fatigue [Recent Change In Weight] : ~T recent weight change [Vision Problems] : vision problems [Palpitations] : palpitations [Lower Ext Edema] : lower extremity edema [Shortness Of Breath] : shortness of breath [Wheezing] : wheezing [Cough] : cough [Dyspnea on Exertion] : dyspnea on exertion [Frequency] : frequency [Joint Stiffness] : joint stiffness [Muscle Weakness] : muscle weakness [Unsteady Walk] : ataxia [Anxiety] : anxiety [Negative] : Gastrointestinal [Chest Pain] : no chest pain [Nocturia] : nocturia [FreeTextEntry2] : weight loss [FreeTextEntry3] : wears prescription glasses [FreeTextEntry5] : +FUNES [FreeTextEntry9] : difficulty holding neck up x 3 years. Leg weakness w/ prolonged ambulation [de-identified] : dryness

## 2024-05-03 NOTE — COUNSELING
[Normal Weight - ( BMI  <25 )] : normal weight - ( BMI  <25 ) [Mediterranean diet recommended] : Mediterranean diet recommended [Smoker, interested in quitting; smoking cessation counseling given (5 mins) and resources provided] : smoker, interested in quitting; smoking cessation counseling given (5 minutes) and resources provided [Smoke/CO Detectors] : smoke/CO detectors [Use assistive device to avoid falls] : use assistive device to avoid falls [Date: ___] : HIV screening completed on [unfilled] [Improve exercise tolerance] : improve exercise tolerance [Decrease hospital use] : decrease hospital use [Minimize unnecessary interventions] : minimize unnecessary interventions [] : abdominal aortic ultrasound

## 2024-05-03 NOTE — HISTORY OF PRESENT ILLNESS
[Patient] : patient [FreeTextEntry4] : see below [FreeTextEntry1] : COPD [FreeTextEntry2] : Mr. HAIR WONG is a 64 year male who has multiple medical conditions including: CHF, emphysema, COPD, pulmonary fibrosis, BPH, HIV, substance use now on MMTP, hx of DVT on lifelong AC. Seen today for f/u visit.  5/3/24 Patient reports doing well/stable overall. No ED visits or hospitalizations. Has been f/u with PCP, cardiology and pulmonology. States pulmonology wants to trial a new medication, but patient is hesitant. Doesn't remember the name.  Did not ask cardiology re; new Holter monitor. Wasn't able to wear the previous one as it fell off.  Has not been evaluated by neuro, forgot to ask for referral. Reports continued weight loss, today states he is 160lb, which is 20lb up from last visit. Pt appears same weight as last visit. Decreasing smoking, now only 2-3 cigarettes/day.  Has 02 concentrator, using it "most of the time". Still becoming very dyspneic w/ exertion such as walking up the stairs. Increases his 02 to 4L, usually wears it at 1-2L. Denies dizziness and pre-syncopal sensation however (improved from prior).  2/9/24 Hospitalization at [facility] x 2 weeks for CHF. Has now been placed on home 02, does not have a concentrator, only tank which he cannot take with him because doesn't fit on walker. Had trouble contacting [facility], resumed care from his previous PCP at [facility] Dr. Stevens. Also resumed care with cardiologist at [facility] Transitioned from Furosemide to Torsemide and Jardiance. Continues to report significant FUNES.  With occasional palpitations during ambulation, pre-syncopal sensation. Resolves w/ rest. "This has been happening for a while". Had a holter monitor from his cardiologist but had trouble attaching it.  Still with neck weakness, inability to keep head up. Also with weakness to legs that becomes worse w/ ambulation. Receiving meals from [facility], reporting better PO intake.  Continuing to have unintentional weight loss. During last visit believed it was due to poor PO intake. Recent weight 140lb (down from 170lb last visit).

## 2024-05-17 NOTE — PHYSICAL THERAPY INITIAL EVALUATION ADULT - PERTINENT HX OF CURRENT PROBLEM, REHAB EVAL
Patient contacted & notified of provider disqualified V-visit. Patient informed that they would not be charged for the visit.     PLAN per virtual provider:  Go to Intermediate Care or Urgent Care.    Pt agreeable to go to UC this morning for evaluation and denied any further questions for writer at this time.    Patient/Caller agrees to follow recommendations.    Was V-Visit Appointment canceled? Video visit appointment was canceled on appointment desk         Patient is a 64 y/o male presents with acute on chronic HFpEF exacerbation in setting of medication noncompliance and suspected CAP. Chief complaint: dyspnea. 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 Patient reports at baseline he is able to ambulate ~1/2 block with his walker prior to resting, however over the last few days he has difficulty even climbing out of bed. Patient further admits to recent fever, chills and productive cough over the past 3 days. Patient admits to orthopnea as he sleeps in recliner and chronic bilateral LE edema. Patient denies any other symptoms of N/V, diaphoresis, palpitations, chest pain or recent travel. Patient reports he self discontinued his Lasix several months ago.

## 2024-06-06 ENCOUNTER — APPOINTMENT (OUTPATIENT)
Dept: HOME HEALTH SERVICES | Facility: HOME HEALTH | Age: 64
End: 2024-06-06

## 2024-07-22 ENCOUNTER — APPOINTMENT (OUTPATIENT)
Dept: HOME HEALTH SERVICES | Facility: HOME HEALTH | Age: 64
End: 2024-07-22
Payer: MEDICARE

## 2024-07-22 VITALS
WEIGHT: 140 LBS | RESPIRATION RATE: 20 BRPM | HEART RATE: 6 BPM | DIASTOLIC BLOOD PRESSURE: 72 MMHG | OXYGEN SATURATION: 92 % | SYSTOLIC BLOOD PRESSURE: 110 MMHG | BODY MASS INDEX: 20.09 KG/M2 | TEMPERATURE: 98.6 F

## 2024-07-22 DIAGNOSIS — Z00.00 ENCOUNTER FOR GENERAL ADULT MEDICAL EXAMINATION W/OUT ABNORMAL FINDINGS: ICD-10-CM

## 2024-07-22 DIAGNOSIS — I50.30 UNSPECIFIED DIASTOLIC (CONGESTIVE) HEART FAILURE: ICD-10-CM

## 2024-07-22 DIAGNOSIS — I27.20 PULMONARY HYPERTENSION, UNSPECIFIED: ICD-10-CM

## 2024-07-22 DIAGNOSIS — J43.9 EMPHYSEMA, UNSPECIFIED: ICD-10-CM

## 2024-07-22 DIAGNOSIS — B20 HUMAN IMMUNODEFICIENCY VIRUS [HIV] DISEASE: ICD-10-CM

## 2024-07-22 DIAGNOSIS — R63.4 ABNORMAL WEIGHT LOSS: ICD-10-CM

## 2024-07-22 DIAGNOSIS — F11.20 OPIOID DEPENDENCE, UNCOMPLICATED: ICD-10-CM

## 2024-07-22 DIAGNOSIS — F41.9 ANXIETY DISORDER, UNSPECIFIED: ICD-10-CM

## 2024-07-22 DIAGNOSIS — I82.409 ACUTE EMBOLISM AND THROMBOSIS OF UNSPECIFIED DEEP VEINS OF UNSPECIFIED LOWER EXTREMITY: ICD-10-CM

## 2024-07-22 PROCEDURE — 99349 HOME/RES VST EST MOD MDM 40: CPT

## 2024-07-22 RX ORDER — CLONAZEPAM 0.5 MG/1
0.5 TABLET ORAL 3 TIMES DAILY
Refills: 0 | Status: ACTIVE | COMMUNITY
Start: 2024-07-12

## 2024-07-22 RX ORDER — SPIRONOLACTONE 25 MG/1
25 TABLET ORAL
Qty: 30 | Refills: 0 | Status: ACTIVE | COMMUNITY
Start: 2024-06-21

## 2024-07-22 RX ORDER — POTASSIUM CHLORIDE 1500 MG/1
20 TABLET, EXTENDED RELEASE ORAL
Qty: 30 | Refills: 0 | Status: ACTIVE | COMMUNITY
Start: 2024-06-21

## 2024-07-22 RX ORDER — GABAPENTIN 400 MG/1
400 CAPSULE ORAL
Qty: 90 | Refills: 0 | Status: ACTIVE | COMMUNITY
Start: 2024-07-12

## 2024-07-22 NOTE — PHYSICAL EXAM
[No Acute Distress] : no acute distress [Normal Sclera/Conjunctiva] : normal sclera/conjunctiva [Normal Outer Ear/Nose] : the ears and nose were normal in appearance [No Accessory Muscle Use] : no accessory muscle use [Normal Rate] : heart rate was normal  [Regular Rhythm] : with a regular rhythm [Normal S1, S2] : normal S1 and S2 [Breast Exam Declined] : patient declined to have breast exam done [Normal Bowel Sounds] : normal bowel sounds [Non Tender] : non-tender [Soft] : abdomen soft [Not Distended] : not distended [Patient Refused] : rectal exam was refused by the patient [Normal Supraclavicular Nodes] : no supraclavicular lymphadenopathy [Normal Anterior Cervical Nodes] : no anterior cervical lymphadenopathy [No CVA Tenderness] : no ~M costovertebral angle tenderness [No Spinal Tenderness] : no spinal tenderness [Normal Strength/Tone] : muscle strength and tone were normal [Cranial Nerves Intact] : cranial nerves 2-12 were intact [Oriented x3] : oriented to person, place, and time [de-identified] : sitting upright in rolling walker appearing older than stated age [de-identified] : ear wax to bilateral ears, difficult to visualize TM [de-identified] : hunched forward, neck flexed. able to straighten out but reports fatigue. hypertrophic posterior neck muscles [de-identified] : rhales throughout, some wheezing - improved w/ symbicort use, fingernails w/ clubbing. [de-identified] : 2-3+ bilateral lower extremity edema. unable to palpate for pedal pulses as patient declines to take off shoes and socks today [de-identified] : no discernable fatiguability noted to shoulders or hips. stooped forward gait.  [de-identified] : legs w/ dry skin [de-identified] : no gross deficit, face symmetrical, strength 5/5 to all extremities, sensation intact

## 2024-07-22 NOTE — ASSESSMENT
[FreeTextEntry1] : - strongly encouraged patient to follow up with specialists, pt w/ care manager at [facility] ([phone]), encouraged he call her to help reschedule the missed appointments - spent a significant amount of time going over records in [place] and explaining previous results to patient - pt receives 1 meal from [facility]. Discussed what type of food he could buy that would support his nutrition - discussed smoking cessation, patient does not want nicotine patches or gum at this time

## 2024-07-22 NOTE — CHRONIC CARE ASSESSMENT
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

## 2024-07-22 NOTE — HISTORY OF PRESENT ILLNESS
[Patient] : patient [FreeTextEntry4] : see below [FreeTextEntry1] : COPD [FreeTextEntry2] : Mr. HAIR WONG is a 64 year male who has multiple medical conditions including: HPpEF, emphysema, COPD, pulmonary fibrosis, ILD, BPH, HIV (on HAART), hx substance use now on MMTP, hx of DVT/PE on lifelong AC. Seen today for f/u visit.  7/22/24 Reports continued weight loss, now 140lb (similar to before) although patient appears thinner. MMTP dose increased to 80mg daily. Pt has to visit clinic twice weekly. Decreased appetite. Denies abdominal pain, n/v/d or constipation.  5/3/24 Patient reports doing well/stable overall. No ED visits or hospitalizations. Has been f/u with PCP, cardiology and pulmonology. States pulmonology wants to trial a new medication, but patient is hesitant. Doesn't remember the name.  Did not ask cardiology re; new Holter monitor. Wasn't able to wear the previous one as it fell off.  Has not been evaluated by neuro, forgot to ask for referral. Reports continued weight loss, today states he is 160lb, which is 20lb up from last visit. Pt appears same weight as last visit. Decreasing smoking, now only 2-3 cigarettes/day.  Has 02 concentrator, using it "most of the time". Still becoming very dyspneic w/ exertion such as walking up the stairs. Increases his 02 to 4L, usually wears it at 1-2L. Denies dizziness and pre-syncopal sensation however (improved from prior).  2/9/24 Hospitalization at [facility] x 2 weeks for CHF. Has now been placed on home 02, does not have a concentrator, only tank which he cannot take with him because doesn't fit on walker. Had trouble contacting [facility], resumed care from his previous PCP at [facility] Dr. Stevens. Also resumed care with cardiologist at [facility] Transitioned from Furosemide to Torsemide and Jardiance. Continues to report significant FUNES.  With occasional palpitations during ambulation, pre-syncopal sensation. Resolves w/ rest. "This has been happening for a while". Had a holter monitor from his cardiologist but had trouble attaching it.  Still with neck weakness, inability to keep head up. Also with weakness to legs that becomes worse w/ ambulation. Receiving meals from [facility], reporting better PO intake.  Continuing to have unintentional weight loss. During last visit believed it was due to poor PO intake. Recent weight 140lb (down from 170lb last visit).

## 2024-07-22 NOTE — REVIEW OF SYSTEMS
[Fatigue] : fatigue [Recent Change In Weight] : ~T recent weight change [Vision Problems] : vision problems [Palpitations] : palpitations [Lower Ext Edema] : lower extremity edema [Shortness Of Breath] : shortness of breath [Wheezing] : wheezing [Cough] : cough [Dyspnea on Exertion] : dyspnea on exertion [Nocturia] : nocturia [Frequency] : frequency [Joint Stiffness] : joint stiffness [Muscle Weakness] : muscle weakness [Unsteady Walk] : ataxia [Anxiety] : anxiety [Negative] : ENT [Chest Pain] : no chest pain [FreeTextEntry2] : weight loss [FreeTextEntry3] : wears prescription glasses [FreeTextEntry5] : +FUNES [FreeTextEntry7] : anorexia [FreeTextEntry9] : difficulty holding neck up x 3 years. Leg weakness w/ prolonged ambulation [de-identified] : dryness

## 2024-07-22 NOTE — HEALTH RISK ASSESSMENT
[HRA Reviewed] : Health risk assessment reviewed [Independent] : managing finances [Some assistance needed] : using transportation [One fall no injury in past year] : Patient reported one fall in the past year without injury [TimeGetUpGo] : 17 [de-identified] : ambulates w/ RW or cane

## 2024-07-22 NOTE — COUNSELING
[Normal Weight - ( BMI  <25 )] : normal weight - ( BMI  <25 ) [Mediterranean diet recommended] : Mediterranean diet recommended [Smoker, interested in quitting; smoking cessation counseling given (5 mins) and resources provided] : smoker, interested in quitting; smoking cessation counseling given (5 minutes) and resources provided [Smoke/CO Detectors] : smoke/CO detectors [Use assistive device to avoid falls] : use assistive device to avoid falls [Date: ___] : HIV screening completed on [unfilled] [] : foot exam [Improve exercise tolerance] : improve exercise tolerance [Decrease hospital use] : decrease hospital use [Minimize unnecessary interventions] : minimize unnecessary interventions

## 2024-09-19 ENCOUNTER — EMERGENCY (EMERGENCY)
Facility: HOSPITAL | Age: 64
LOS: 1 days | Discharge: HOME | End: 2024-09-19
Admitting: EMERGENCY MEDICINE
Payer: MEDICARE

## 2024-09-19 VITALS
DIASTOLIC BLOOD PRESSURE: 77 MMHG | TEMPERATURE: 98 F | OXYGEN SATURATION: 96 % | SYSTOLIC BLOOD PRESSURE: 116 MMHG | HEART RATE: 64 BPM | RESPIRATION RATE: 16 BRPM

## 2024-09-19 VITALS
RESPIRATION RATE: 18 BRPM | OXYGEN SATURATION: 96 % | TEMPERATURE: 98 F | HEART RATE: 83 BPM | DIASTOLIC BLOOD PRESSURE: 65 MMHG | SYSTOLIC BLOOD PRESSURE: 96 MMHG

## 2024-09-19 DIAGNOSIS — E78.5 HYPERLIPIDEMIA, UNSPECIFIED: ICD-10-CM

## 2024-09-19 DIAGNOSIS — J44.9 CHRONIC OBSTRUCTIVE PULMONARY DISEASE, UNSPECIFIED: ICD-10-CM

## 2024-09-19 DIAGNOSIS — I50.9 HEART FAILURE, UNSPECIFIED: ICD-10-CM

## 2024-09-19 DIAGNOSIS — Z21 ASYMPTOMATIC HUMAN IMMUNODEFICIENCY VIRUS [HIV] INFECTION STATUS: ICD-10-CM

## 2024-09-19 DIAGNOSIS — R55 SYNCOPE AND COLLAPSE: ICD-10-CM

## 2024-09-19 DIAGNOSIS — Z86.718 PERSONAL HISTORY OF OTHER VENOUS THROMBOSIS AND EMBOLISM: ICD-10-CM

## 2024-09-19 DIAGNOSIS — Z79.01 LONG TERM (CURRENT) USE OF ANTICOAGULANTS: ICD-10-CM

## 2024-09-19 DIAGNOSIS — Z87.891 PERSONAL HISTORY OF NICOTINE DEPENDENCE: ICD-10-CM

## 2024-09-19 DIAGNOSIS — Z99.81 DEPENDENCE ON SUPPLEMENTAL OXYGEN: ICD-10-CM

## 2024-09-19 LAB
BACTERIA # UR AUTO: NEGATIVE /HPF — SIGNIFICANT CHANGE UP
BASOPHILS # BLD AUTO: 0.01 K/UL — SIGNIFICANT CHANGE UP (ref 0–0.2)
BASOPHILS NFR BLD AUTO: 0.2 % — SIGNIFICANT CHANGE UP (ref 0–2)
EOSINOPHIL # BLD AUTO: 0.04 K/UL — SIGNIFICANT CHANGE UP (ref 0–0.5)
EOSINOPHIL NFR BLD AUTO: 0.8 % — SIGNIFICANT CHANGE UP (ref 0–6)
ETHANOL SERPL-MCNC: <3 MG/DL — SIGNIFICANT CHANGE UP
HCT VFR BLD CALC: 39.5 % — SIGNIFICANT CHANGE UP (ref 39–50)
HGB BLD-MCNC: 12.8 G/DL — LOW (ref 13–17)
HYALINE CASTS # UR AUTO: PRESENT
IMM GRANULOCYTES NFR BLD AUTO: 0.6 % — SIGNIFICANT CHANGE UP (ref 0–0.9)
LYMPHOCYTES # BLD AUTO: 1.23 K/UL — SIGNIFICANT CHANGE UP (ref 1–3.3)
LYMPHOCYTES # BLD AUTO: 25 % — SIGNIFICANT CHANGE UP (ref 13–44)
MCHC RBC-ENTMCNC: 30.8 PG — SIGNIFICANT CHANGE UP (ref 27–34)
MCHC RBC-ENTMCNC: 32.4 GM/DL — SIGNIFICANT CHANGE UP (ref 32–36)
MCV RBC AUTO: 95.2 FL — SIGNIFICANT CHANGE UP (ref 80–100)
MONOCYTES # BLD AUTO: 0.45 K/UL — SIGNIFICANT CHANGE UP (ref 0–0.9)
MONOCYTES NFR BLD AUTO: 9.1 % — SIGNIFICANT CHANGE UP (ref 2–14)
NEUTROPHILS # BLD AUTO: 3.16 K/UL — SIGNIFICANT CHANGE UP (ref 1.8–7.4)
NEUTROPHILS NFR BLD AUTO: 64.3 % — SIGNIFICANT CHANGE UP (ref 43–77)
NRBC # BLD: 0 /100 WBCS — SIGNIFICANT CHANGE UP (ref 0–0)
PLATELET # BLD AUTO: 173 K/UL — SIGNIFICANT CHANGE UP (ref 150–400)
RBC # BLD: 4.15 M/UL — LOW (ref 4.2–5.8)
RBC # FLD: 14.8 % — HIGH (ref 10.3–14.5)
RBC CASTS # UR COMP ASSIST: 0 /HPF — SIGNIFICANT CHANGE UP (ref 0–4)
WBC # BLD: 4.92 K/UL — SIGNIFICANT CHANGE UP (ref 3.8–10.5)
WBC # FLD AUTO: 4.92 K/UL — SIGNIFICANT CHANGE UP (ref 3.8–10.5)
WBC UR QL: 0 /HPF — SIGNIFICANT CHANGE UP (ref 0–5)

## 2024-09-19 PROCEDURE — 71045 X-RAY EXAM CHEST 1 VIEW: CPT | Mod: 26

## 2024-09-19 PROCEDURE — 99285 EMERGENCY DEPT VISIT HI MDM: CPT

## 2024-09-19 PROCEDURE — 71275 CT ANGIOGRAPHY CHEST: CPT | Mod: 26,MC

## 2024-09-19 PROCEDURE — 70450 CT HEAD/BRAIN W/O DYE: CPT | Mod: 26,MC

## 2024-09-19 RX ORDER — SODIUM CHLORIDE 9 MG/ML
500 INJECTION INTRAMUSCULAR; INTRAVENOUS; SUBCUTANEOUS ONCE
Refills: 0 | Status: DISCONTINUED | OUTPATIENT
Start: 2024-09-19 | End: 2024-09-19

## 2024-09-19 NOTE — ED ADULT NURSE NOTE - NSFALLRISKINTERV_ED_ALL_ED

## 2024-09-19 NOTE — ED ADULT TRIAGE NOTE - CHIEF COMPLAINT QUOTE
Pt BIBEMS from home s/p syncopal episode. Pt states that he was walking out of his door when it happened. As per ems pt hypotensive on arrive with systolic in 80's and hypoxic at 78 on 2L NC. + headstrike + blood thinners Pt given 950cc of NS by EMS prior to arrival. L 18g IV to left arm. Pt with history of COPD in 2L NC baseline. Patient expressed no known problems or needs

## 2024-09-19 NOTE — ED ADULT NURSE NOTE - CHIEF COMPLAINT QUOTE
Pt BIBEMS from home s/p syncopal episode. Pt states that he was walking out of his door when it happened. As per ems pt hypotensive on arrive with systolic in 80's and hypoxic at 78 on 2L NC. + headstrike + blood thinners Pt given 950cc of NS by EMS prior to arrival. L 18g IV to left arm. Pt with history of COPD in 2L NC baseline.

## 2024-09-19 NOTE — ED PROVIDER NOTE - PHYSICAL EXAMINATION
General: well developed, well nourished, no distress  Eye: bilateral: PERRL, EOMI  Ears, Nose, Throat: normal pharynx, TMs normal, membranes moist.  Neck: non-tender, full range of motion, supple.  Negative For: lymphadenopathy (R), lymphadenopathy (L)  Respiratory: CTAB.  Cardiovascular: S1-S2 normal, regular rate, regular rhythm.  Abdomen: normal bowel sounds, non tender, soft.    Genitourinary: Negative For: CVA tenderness  Musculoskeletal:  Negative For: back pain, upper, back pain  Extremities: normal range of motion, non-tender.  Negative For: edema (R), edema (L), calf tenderness (R), calf tenderness (L), swelling  Extremity Strength: upper extremities equal bilateral: 5/5, lower extremities equal bilateral: 5/5  Neurologic: alert, oriented to person, oriented to place, oriented to time.    Skin: normal color.  Negative For: rash  Psychiatric: normal affect, normal insight, normal concentration

## 2024-09-19 NOTE — ED PROVIDER NOTE - PROGRESS NOTE DETAILS
Patient sitting comfortably in room, no acute stress  Labs and imaging reviewed  Upon review labs are his baseline, EKGs as baseline, and a CT PE protocol was done which showed no acute pathology  Reviewed with patient admission versus discharge.  Patient is adamant to be discharged, does not want to be admitted.  Strict return precaution given to patient  All results with patient.  Patient understands and agrees with plan.  Agreed to follow-up primary care doctor in 2 to 3 days.

## 2024-09-19 NOTE — ED ADULT NURSE REASSESSMENT NOTE - NS ED NURSE REASSESS COMMENT FT1
pt received from previous RN, Russell, maintained on cardiac and O2 monitoring. Rpt mint top sent to lab. Pt c/o feeling weak, otherwise denies CP, SOB, or other complaints. Care ongoing.

## 2024-09-19 NOTE — ED PROVIDER NOTE - OBJECTIVE STATEMENT
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  Patient states that he was getting up using his walker, when he felt lightheaded, and passed out, falling onto the ground.  Patient states that he does not think he had any head strike.  On I was called by a bystander, and he was brought to the emergency department for evaluation.  Patient was found to be hypotensive at 80/60 in the field, so he was given 1 L of fluid through an IV.  Currently patient denies any symptoms.

## 2024-09-19 NOTE — ED PROVIDER NOTE - PATIENT PORTAL LINK FT
You can access the FollowMyHealth Patient Portal offered by [facility] by registering at the following website: [email or web address]. By joining [facility]’s FollowMyHealth portal, you will also be able to view your health information using other applications (apps) compatible with our system.

## 2024-09-19 NOTE — ED PROVIDER NOTE - CLINICAL SUMMARY MEDICAL DECISION MAKING FREE TEXT BOX
64-year-old male presents as a referral for an episode of syncope  On arrival vital signs are stable, and physical exam is grossly unremarkable  Will obtain labs, chest x-ray, CT head  Will also obtain a CT PE protocol  EKG shows T wave inversions in the inferior lateral leads, however this is old as per MUSE review.

## 2024-09-19 NOTE — ED ADULT NURSE NOTE - NS ED NURSE RECORD ANOTHER HT AND WT
March 10, 2019      [facility] [place]  [facility] [address]  Phone: [phone]  Fax: [phone]       Patient: Preeti Giles   YOB: 1991  Date of Visit: 03/10/2019    To Whom It May Concern:    Lila Giles  was at [facility] on 03/10/2019. She may return to work/school on 3/10/19 with no restrictions. If you have any questions or concerns, or if I can be of further assistance, please do not hesitate to contact me.    Sincerely,    Billie Patel NP       
Yes

## 2024-09-27 ENCOUNTER — APPOINTMENT (OUTPATIENT)
Dept: HOME HEALTH SERVICES | Facility: HOME HEALTH | Age: 64
End: 2024-09-27

## 2024-09-27 DIAGNOSIS — I50.30 UNSPECIFIED DIASTOLIC (CONGESTIVE) HEART FAILURE: ICD-10-CM

## 2024-09-27 DIAGNOSIS — J84.9 INTERSTITIAL PULMONARY DISEASE, UNSPECIFIED: ICD-10-CM

## 2024-09-27 DIAGNOSIS — R63.4 ABNORMAL WEIGHT LOSS: ICD-10-CM

## 2024-09-27 DIAGNOSIS — R59.0 LOCALIZED ENLARGED LYMPH NODES: ICD-10-CM

## 2024-09-27 DIAGNOSIS — I27.20 PULMONARY HYPERTENSION, UNSPECIFIED: ICD-10-CM

## 2024-09-27 DIAGNOSIS — F11.20 OPIOID DEPENDENCE, UNCOMPLICATED: ICD-10-CM

## 2024-09-27 DIAGNOSIS — Z21 ASYMPTOMATIC HUMAN IMMUNODEFICIENCY VIRUS [HIV] INFECTION STATUS: ICD-10-CM

## 2024-09-27 DIAGNOSIS — Z23 ENCOUNTER FOR IMMUNIZATION: ICD-10-CM

## 2024-09-27 PROCEDURE — 99349 HOME/RES VST EST MOD MDM 40: CPT | Mod: 25

## 2024-09-27 PROCEDURE — 90662 IIV NO PRSV INCREASED AG IM: CPT

## 2024-09-27 PROCEDURE — G0008: CPT

## 2024-09-27 RX ORDER — ERGOCALCIFEROL 1.25 MG/1
1.25 MG CAPSULE, LIQUID FILLED ORAL
Qty: 8 | Refills: 0 | Status: ACTIVE | COMMUNITY
Start: 2024-09-27 | End: 1900-01-01

## 2024-09-28 VITALS
OXYGEN SATURATION: 91 % | RESPIRATION RATE: 20 BRPM | DIASTOLIC BLOOD PRESSURE: 88 MMHG | SYSTOLIC BLOOD PRESSURE: 116 MMHG | TEMPERATURE: 98.4 F | HEART RATE: 89 BPM

## 2024-09-28 PROBLEM — Z23 ENCOUNTER FOR IMMUNIZATION: Status: ACTIVE | Noted: 2023-10-26

## 2024-09-28 PROBLEM — R59.0 LYMPHADENOPATHY, HILAR: Status: ACTIVE | Noted: 2024-09-28

## 2024-09-28 NOTE — CHRONIC CARE ASSESSMENT
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

## 2024-09-28 NOTE — HISTORY OF PRESENT ILLNESS
[Patient] : patient [FreeTextEntry4] : see below [FreeTextEntry1] : COPD [FreeTextEntry2] : Mr. HAIR WONG is a 64 year male who has multiple medical conditions including: HPpEF, emphysema, COPD, pulmonary fibrosis, ILD, BPH, HIV (on HAART), hx substance use now on MMTP, hx of DVT/PE on lifelong AC.   Seen today to f/u on ED visit last week.   9/27/24 ED visit last week due to syncope, w/u included blood work, ct head and ctpe - no acute findings, patient discharged home. Was unable to be reached for 48 hour f/u. Seen today for evaluation.  Pt continues to not feel well, denies any more syncopal episodes. Concerned that he was deemed dehydrated, is now skeptical of taking his diuretic.  Increased weakness, has not been able to get to MMTP clinic. Poor appetite, has [facility] meals delivery and Ensure but "just not hungry". Denies abd pain, changes in color of stool or urine.   7/22/24 Reports continued weight loss, now 140lb (similar to before) although patient appears thinner. MMTP dose increased to 80mg daily. Pt has to visit clinic twice weekly. Decreased appetite. Denies abdominal pain, n/v/d or constipation.  5/3/24 Patient reports doing well/stable overall. No ED visits or hospitalizations. Has been f/u with PCP, cardiology and pulmonology. States pulmonology wants to trial a new medication, but patient is hesitant. Doesn't remember the name.  Did not ask cardiology re; new Holter monitor. Wasn't able to wear the previous one as it fell off.  Has not been evaluated by neuro, forgot to ask for referral. Reports continued weight loss, today states he is 160lb, which is 20lb up from last visit. Pt appears same weight as last visit. Decreasing smoking, now only 2-3 cigarettes/day.  Has 02 concentrator, using it "most of the time". Still becoming very dyspneic w/ exertion such as walking up the stairs. Increases his 02 to 4L, usually wears it at 1-2L. Denies dizziness and pre-syncopal sensation however (improved from prior).  2/9/24 Hospitalization at [facility] x 2 weeks for CHF. Has now been placed on home 02, does not have a concentrator, only tank which he cannot take with him because doesn't fit on walker. Had trouble contacting [facility], resumed care from his previous PCP at [facility] Dr. Stevens. Also resumed care with cardiologist at [facility] Transitioned from Furosemide to Torsemide and Jardiance. Continues to report significant FUNES.  With occasional palpitations during ambulation, pre-syncopal sensation. Resolves w/ rest. "This has been happening for a while". Had a holter monitor from his cardiologist but had trouble attaching it.  Still with neck weakness, inability to keep head up. Also with weakness to legs that becomes worse w/ ambulation. Receiving meals from [facility], reporting better PO intake.  Continuing to have unintentional weight loss. During last visit believed it was due to poor PO intake. Recent weight 140lb (down from 170lb last visit).

## 2024-09-28 NOTE — COUNSELING
[Normal Weight - ( BMI  <25 )] : normal weight - ( BMI  <25 ) [Mediterranean diet recommended] : Mediterranean diet recommended [Smoker, interested in quitting; smoking cessation counseling given (5 mins) and resources provided] : smoker, interested in quitting; smoking cessation counseling given (5 minutes) and resources provided [Smoke/CO Detectors] : smoke/CO detectors [Use assistive device to avoid falls] : use assistive device to avoid falls [Date: ___] : HIV screening completed on [unfilled] [] : foot exam [Improve exercise tolerance] : improve exercise tolerance [Decrease hospital use] : decrease hospital use [Minimize unnecessary interventions] : minimize unnecessary interventions [Discussed disease trajectory with patient/caregiver] : discussed disease trajectory with patient/caregiver [Full Code] : Code Status: Full Code [No Limitations] : Treatment Guidelines: No limitations [Long Term Intubation] : Intubation: Long term intubation [[facility] Date: _____] [facility]/last verification date: [unfilled]

## 2024-09-28 NOTE — PHYSICAL EXAM
[No Acute Distress] : no acute distress [Normal Sclera/Conjunctiva] : normal sclera/conjunctiva [Normal Outer Ear/Nose] : the ears and nose were normal in appearance [No Accessory Muscle Use] : no accessory muscle use [Normal Rate] : heart rate was normal  [Regular Rhythm] : with a regular rhythm [Normal S1, S2] : normal S1 and S2 [Breast Exam Declined] : patient declined to have breast exam done [Normal Bowel Sounds] : normal bowel sounds [Non Tender] : non-tender [Soft] : abdomen soft [Not Distended] : not distended [Patient Refused] : rectal exam was refused by the patient [Normal Supraclavicular Nodes] : no supraclavicular lymphadenopathy [Normal Anterior Cervical Nodes] : no anterior cervical lymphadenopathy [No CVA Tenderness] : no ~M costovertebral angle tenderness [No Spinal Tenderness] : no spinal tenderness [Normal Strength/Tone] : muscle strength and tone were normal [Cranial Nerves Intact] : cranial nerves 2-12 were intact [Oriented x3] : oriented to person, place, and time [de-identified] : ear wax to bilateral ears, difficult to visualize TM. no oral lesions noted. [de-identified] : sitting upright in rolling walker appearing older than stated age [de-identified] : hunched forward, neck flexed. able to straighten out but reports fatigue. hypertrophic posterior neck muscles [de-identified] : rhales throughout, some wheezing - improved w/ symbicort use, fingernails w/ clubbing. [de-identified] : 2-3+ bilateral lower extremity edema. unable to palpate for pedal pulses as patient declines to take off shoes and socks today [de-identified] : no discernable fatiguability noted to shoulders or hips. stooped forward gait.  [de-identified] : legs w/ dry skin [de-identified] : no gross deficit, face symmetrical, strength 5/5 to all extremities, sensation intact

## 2024-09-28 NOTE — COUNSELING
[Normal Weight - ( BMI  <25 )] : normal weight - ( BMI  <25 ) [Mediterranean diet recommended] : Mediterranean diet recommended [Smoker, interested in quitting; smoking cessation counseling given (5 mins) and resources provided] : smoker, interested in quitting; smoking cessation counseling given (5 minutes) and resources provided [Smoke/CO Detectors] : smoke/CO detectors [Use assistive device to avoid falls] : use assistive device to avoid falls [Date: ___] : HIV screening completed on [unfilled] [] : foot exam [Improve exercise tolerance] : improve exercise tolerance [Decrease hospital use] : decrease hospital use [Minimize unnecessary interventions] : minimize unnecessary interventions [Discussed disease trajectory with patient/caregiver] : discussed disease trajectory with patient/caregiver [Full Code] : Code Status: Full Code [No Limitations] : Treatment Guidelines: No limitations [Long Term Intubation] : Intubation: Long term intubation [[facility] Ver[facility] Date: _____] : [facility] Com[facility]/last verification date: [unfilled]

## 2024-09-28 NOTE — CHRONIC CARE ASSESSMENT
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

## 2024-09-28 NOTE — HEALTH RISK ASSESSMENT
[HRA Reviewed] : Health risk assessment reviewed [Independent] : managing finances [Some assistance needed] : using transportation [One fall no injury in past year] : Patient reported one fall in the past year without injury [TimeGetUpGo] : 17 [de-identified] : ambulates w/ RW or cane

## 2024-09-28 NOTE — HISTORY OF PRESENT ILLNESS
[Patient] : patient [FreeTextEntry4] : see below [FreeTextEntry1] : COPD [FreeTextEntry2] : Mr. HAIR WONG is a 64 year male who has multiple medical conditions including: HPpEF, emphysema, COPD, pulmonary fibrosis, ILD, BPH, HIV (on HAART), hx substance use now on MMTP, hx of DVT/PE on lifelong AC.   Seen today to f/u on ED visit last week.   9/27/24 ED visit last week due to syncope, w/u included blood work, ct head and ctpe - no acute findings, patient discharged home. Was unable to be reached for 48 hour f/u. Seen today for evaluation.  Pt continues to not feel well, denies any more syncopal episodes. Concerned that he was deemed dehydrated, is now skeptical of taking his diuretic.  Increased weakness, has not been able to get to MMTP clinic. Poor appetite, has [employer] meals delivery and Ensure but "just not hungry". Denies abd pain, changes in color of stool or urine.   7/22/24 Reports continued weight loss, now 140lb (similar to before) although patient appears thinner. MMTP dose increased to 80mg daily. Pt has to visit clinic twice weekly. Decreased appetite. Denies abdominal pain, n/v/d or constipation.  5/3/24 Patient reports doing well/stable overall. No ED visits or hospitalizations. Has been f/u with PCP, cardiology and pulmonology. States pulmonology wants to trial a new medication, but patient is hesitant. Doesn't remember the name.  Did not ask cardiology re; new Holter monitor. Wasn't able to wear the previous one as it fell off.  Has not been evaluated by neuro, forgot to ask for referral. Reports continued weight loss, today states he is 160lb, which is 20lb up from last visit. Pt appears same weight as last visit. Decreasing smoking, now only 2-3 cigarettes/day.  Has 02 concentrator, using it "most of the time". Still becoming very dyspneic w/ exertion such as walking up the stairs. Increases his 02 to 4L, usually wears it at 1-2L. Denies dizziness and pre-syncopal sensation however (improved from prior).  2/9/24 Hospitalization at [facility] x 2 weeks for CHF. Has now been placed on home 02, does not have a concentrator, only tank which he cannot take with him because doesn't fit on walker. Had trouble contacting [facility], resumed care from his previous PCP at [facility] Dr. Stevens. Also resumed care with cardiologist at [facility] Transitioned from Furosemide to Torsemide and Jardiance. Continues to report significant FUNES.  With occasional palpitations during ambulation, pre-syncopal sensation. Resolves w/ rest. "This has been happening for a while". Had a holter monitor from his cardiologist but had trouble attaching it.  Still with neck weakness, inability to keep head up. Also with weakness to legs that becomes worse w/ ambulation. Receiving meals from [employer], reporting better PO intake.  Continuing to have unintentional weight loss. During last visit believed it was due to poor PO intake. Recent weight 140lb (down from 170lb last visit).

## 2024-09-28 NOTE — DATA REVIEWED
[FreeTextEntry1] : [facility] Labs Notable for; K 3.4 Cr 1.37 / BUN 14 Albumin 2.8 eGFR 57 BNP 6050 Troponin negative 28.3  MyChart Labs Reviewed; 8/24; Vitamin D 15.8, Cr 0.86/BUN K3.9, Na 137, Cl 94, C02 31, Anion Gap 11, Glucose 74, Albumin 3.3, LFTs WNL, eGFR 95 apolipoprotein B 60 HIV-1 Viral Load - undetected Trig 33, T. Chol 98, HDL HDL 35, LDL 55

## 2024-09-28 NOTE — HEALTH RISK ASSESSMENT
[HRA Reviewed] : Health risk assessment reviewed [Independent] : managing finances [Some assistance needed] : using transportation [One fall no injury in past year] : Patient reported one fall in the past year without injury [de-identified] : ambulates w/ RW or cane [TimeGetUpGo] : 17

## 2024-09-28 NOTE — PHYSICAL EXAM
[No Acute Distress] : no acute distress [Normal Sclera/Conjunctiva] : normal sclera/conjunctiva [Normal Outer Ear/Nose] : the ears and nose were normal in appearance [No Accessory Muscle Use] : no accessory muscle use [Normal Rate] : heart rate was normal  [Regular Rhythm] : with a regular rhythm [Normal S1, S2] : normal S1 and S2 [Breast Exam Declined] : patient declined to have breast exam done [Normal Bowel Sounds] : normal bowel sounds [Non Tender] : non-tender [Soft] : abdomen soft [Not Distended] : not distended [Patient Refused] : rectal exam was refused by the patient [Normal Supraclavicular Nodes] : no supraclavicular lymphadenopathy [Normal Anterior Cervical Nodes] : no anterior cervical lymphadenopathy [No CVA Tenderness] : no ~M costovertebral angle tenderness [No Spinal Tenderness] : no spinal tenderness [Normal Strength/Tone] : muscle strength and tone were normal [Cranial Nerves Intact] : cranial nerves 2-12 were intact [Oriented x3] : oriented to person, place, and time [de-identified] : ear wax to bilateral ears, difficult to visualize TM. no oral lesions noted. [de-identified] : sitting upright in rolling walker appearing older than stated age [de-identified] : hunched forward, neck flexed. able to straighten out but reports fatigue. hypertrophic posterior neck muscles [de-identified] : rhales throughout, some wheezing - improved w/ symbicort use, fingernails w/ clubbing. [de-identified] : 2-3+ bilateral lower extremity edema. unable to palpate for pedal pulses as patient declines to take off shoes and socks today [de-identified] : no discernable fatiguability noted to shoulders or hips. stooped forward gait.  [de-identified] : legs w/ dry skin [de-identified] : no gross deficit, face symmetrical, strength 5/5 to all extremities, sensation intact

## 2024-09-28 NOTE — REVIEW OF SYSTEMS
[Fatigue] : fatigue [Recent Change In Weight] : ~T recent weight change [Vision Problems] : vision problems [Palpitations] : palpitations [Lower Ext Edema] : lower extremity edema [Shortness Of Breath] : shortness of breath [Wheezing] : wheezing [Cough] : cough [Dyspnea on Exertion] : dyspnea on exertion [Nocturia] : nocturia [Frequency] : frequency [Joint Stiffness] : joint stiffness [Muscle Weakness] : muscle weakness [Unsteady Walk] : ataxia [Anxiety] : anxiety [Negative] : ENT [Chest Pain] : no chest pain [FreeTextEntry2] : weight loss [FreeTextEntry3] : wears prescription glasses [FreeTextEntry5] : +FUNES, occasional dizziness [FreeTextEntry7] : anorexia [FreeTextEntry9] : difficulty holding neck up x 3 years. Leg weakness w/ prolonged ambulation [de-identified] : dryness

## 2024-09-28 NOTE — ASSESSMENT
[FreeTextEntry1] : - strongly encouraged patient to follow up with specialists, pt w/ care manager at [facility] ([phone]), encouraged he call her to help reschedule the missed appointments - assisted pt in messaging [facility] PCP to move up his appointment

## 2024-09-28 NOTE — DATA REVIEWED
[FreeTextEntry1] : [facility] Labs Notable for; K 3.4 Cr 1.37 / BUN 14 Albumin 2.8 eGFR 57 BNP 6050 Troponin negative 28.3  MyChart Labs Reviewed; 8/24; Vitamin D 15.8, Cr 0.86/BUN K3.9, Na 137, Cl 94, C02 31, Anion Gap 11, Glucose 74, Albumin 3.3, LFTs WNL, eGFR 95 apolipoprotein B 60 HIV-1 Viral Load - undetected Trig 33, T. Chol 98, HDL HDL 35, LDL 55

## 2024-09-28 NOTE — REVIEW OF SYSTEMS
[Fatigue] : fatigue [Recent Change In Weight] : ~T recent weight change [Vision Problems] : vision problems [Palpitations] : palpitations [Lower Ext Edema] : lower extremity edema [Shortness Of Breath] : shortness of breath [Wheezing] : wheezing [Cough] : cough [Dyspnea on Exertion] : dyspnea on exertion [Nocturia] : nocturia [Frequency] : frequency [Joint Stiffness] : joint stiffness [Muscle Weakness] : muscle weakness [Unsteady Walk] : ataxia [Anxiety] : anxiety [Negative] : ENT [Chest Pain] : no chest pain [FreeTextEntry2] : weight loss [FreeTextEntry3] : wears prescription glasses [FreeTextEntry7] : anorexia [FreeTextEntry5] : +FUNES, occasional dizziness [FreeTextEntry9] : difficulty holding neck up x 3 years. Leg weakness w/ prolonged ambulation [de-identified] : dryness

## 2024-09-28 NOTE — ASSESSMENT
[FreeTextEntry1] : - strongly encouraged patient to follow up with specialists, pt w/ care manager at [facility] ([phone]), encouraged he call her to help reschedule the missed appointments - assisted pt in messaging [facility] PCP to move up his appointment

## 2024-09-30 ENCOUNTER — LABORATORY RESULT (OUTPATIENT)
Age: 64
End: 2024-09-30

## 2024-10-01 ENCOUNTER — LABORATORY RESULT (OUTPATIENT)
Age: 64
End: 2024-10-01

## 2024-10-02 ENCOUNTER — APPOINTMENT (OUTPATIENT)
Dept: HOME HEALTH SERVICES | Facility: HOME HEALTH | Age: 64
End: 2024-10-02

## 2024-10-02 ENCOUNTER — LABORATORY RESULT (OUTPATIENT)
Age: 64
End: 2024-10-02

## 2024-10-08 ENCOUNTER — NON-APPOINTMENT (OUTPATIENT)
Age: 64
End: 2024-10-08

## 2024-11-11 ENCOUNTER — APPOINTMENT (OUTPATIENT)
Dept: HOME HEALTH SERVICES | Facility: HOME HEALTH | Age: 64
End: 2024-11-11

## 2024-12-19 NOTE — PROGRESS NOTE ADULT - PROBLEM SELECTOR PROBLEM 2
December 19, 2024  Re: Jesus Alberto Domingo  2005    Dear Dr. Vo,    Thank you so much for referring Jesus Alberto Domingo to the [facility]. I had the pleasure of visiting with Jesus Alberto today.     Attached you will find a copy of my note. Please feel free to reach out to me with any questions, [phone].     Jesus Alberto is in to see us following his surgery and unfortunately the breathing is no better on the left side.  The batten graft is palpable externally but the sidewall is thick in the region of the batten graft placement.  Support of this area immediately improves her breathing.  I injected 0.1 of 10 mg/cc Kenalog mixed with a small amount of 5 FU today and he will see Dr. Gamboa in 6 weeks.  He may benefit from some 40 mg/cc Kenalog but we did not have any today.  I talked him about the potential down the road of thinning the scar on the undersurface of the batten graft and using a stent afterwards.  I gave him some stents to try today.  He feels that there is still some variable additional obstruction that he experiences.  His septum posteriorly looks excellent and his turbinates at this point today are not large.  I am going to have him experiment with some Afrin on an occasional basis to see if that alleviates some of the fluctuating component of his breathing challenges he understands the concept and not to use it on a long-term basis.  Will have him see Dr. Gamboa in 6 weeks.BRYSON DELGADO MD         Your trust in our practice and care is much appreciated.    Sincerely,  BRYSON DELGADO MD Pulmonary embolism

## 2025-06-06 NOTE — ED PROVIDER NOTE - CHIEF COMPLAINT
The patient is a 63y Male complaining of shortness of breath. Patient presenting with persistent severe diffuse facial pain, nasal edema, associated with thick mucous nasal discharge x1.5 week, similar to prior vasculitis flare-up 3 mo ago  - No leukocytosis, patient afebrile on admission  - CT maxillofacial showing chronic changes, concern for chronic osteomyelitis  - MR with concern for OM - ID recs long term IV abx - pt agreeable to LITO to facilitate  - Continue vancomycin + rocephin  - Trend CBC w/ diff, fever curve   - ID (Dr. Adkins) consulted, follow up recs  - Pain management with ATC and PRN dilaudid as per pain mgmt - will trial standing toradol q8 x 3 doses - ppi while on NSAIDs/steroids - pt reports significant NSAID use at home  - Continue IV steroids - MRI reviewed and d/w ENT consultant  - D/w Dr. Watson - states steroids + iv abx. no role for biopsy at present to check labwork first for wegeners and to follow with ENT on discharge. Pt saw rheum last month - outpt labs reviewd - per outpt rheum no definitive evidence of a vasculitis Patient presenting with persistent severe diffuse facial pain, nasal edema, associated with thick mucous nasal discharge x1.5 week, similar to prior vasculitis flare-up 3 mo ago  - No leukocytosis, patient afebrile on admission  - CT maxillofacial showing chronic changes, concern for chronic osteomyelitis  - MR with concern for OM - ID recs long term IV abx - pt agreeable to LITO to facilitate - PICC ordered d/w IR/ID  - Continue rocephin - changing vanco to daptomycin as planned for long course - monitor CK/CMP  - Trend CBC w/ diff, fever curve - leukocytosis likely steroid induced  - ID (Dr. Adkins) consulted, follow up recs  - Pain management with ATC and PRN dilaudid as per pain mgmt - supplement with toradol for 24h and then switch to PO ibuprofen in timed intervals - ppi while on NSAIDs/steroids - pt reports significant NSAID use at home  - Continue IV steroids for now - will change to PO soon - MRI reviewed and d/w ENT consultant  - D/w Dr. Watson - states steroids + iv abx. no role for biopsy at present to check labwork first for wegeners and to follow with ENT on discharge. Pt saw rheum last month - outpt labs reviewd - per outpt rheum no definitive evidence of a vasculitis